# Patient Record
Sex: FEMALE | Race: WHITE | NOT HISPANIC OR LATINO | Employment: FULL TIME | URBAN - METROPOLITAN AREA
[De-identification: names, ages, dates, MRNs, and addresses within clinical notes are randomized per-mention and may not be internally consistent; named-entity substitution may affect disease eponyms.]

---

## 2019-06-13 ENCOUNTER — HOSPITAL ENCOUNTER (EMERGENCY)
Facility: HOSPITAL | Age: 34
Discharge: HOME | End: 2019-06-13
Attending: FAMILY MEDICINE
Payer: COMMERCIAL

## 2019-06-13 VITALS
HEIGHT: 63 IN | SYSTOLIC BLOOD PRESSURE: 106 MMHG | OXYGEN SATURATION: 98 % | TEMPERATURE: 100 F | WEIGHT: 164.25 LBS | HEART RATE: 75 BPM | RESPIRATION RATE: 15 BRPM | BODY MASS INDEX: 29.1 KG/M2 | DIASTOLIC BLOOD PRESSURE: 52 MMHG

## 2019-06-13 DIAGNOSIS — R10.9 FLANK PAIN: ICD-10-CM

## 2019-06-13 DIAGNOSIS — N20.0 KIDNEY STONE: Primary | ICD-10-CM

## 2019-06-13 LAB
ALBUMIN SERPL BCP-MCNC: 4 G/DL (ref 3.5–5.2)
ALP SERPL-CCNC: 54 U/L (ref 55–135)
ALT SERPL W/O P-5'-P-CCNC: 20 U/L (ref 10–44)
ANION GAP SERPL CALC-SCNC: 9 MMOL/L (ref 8–16)
AST SERPL-CCNC: 20 U/L (ref 10–40)
BASOPHILS # BLD AUTO: 0.02 K/UL (ref 0–0.2)
BASOPHILS NFR BLD: 0.1 % (ref 0–1.9)
BILIRUB SERPL-MCNC: 1 MG/DL (ref 0.1–1)
BILIRUB UR QL STRIP: NEGATIVE
BUN SERPL-MCNC: 15 MG/DL (ref 6–20)
CALCIUM SERPL-MCNC: 9.1 MG/DL (ref 8.7–10.5)
CHLORIDE SERPL-SCNC: 106 MMOL/L (ref 95–110)
CLARITY UR: CLEAR
CO2 SERPL-SCNC: 23 MMOL/L (ref 23–29)
COLOR UR: YELLOW
CREAT SERPL-MCNC: 0.8 MG/DL (ref 0.5–1.4)
DIFFERENTIAL METHOD: ABNORMAL
EOSINOPHIL # BLD AUTO: 0.2 K/UL (ref 0–0.5)
EOSINOPHIL NFR BLD: 1.4 % (ref 0–8)
ERYTHROCYTE [DISTWIDTH] IN BLOOD BY AUTOMATED COUNT: 12.6 % (ref 11.5–14.5)
EST. GFR  (AFRICAN AMERICAN): >60 ML/MIN/1.73 M^2
EST. GFR  (NON AFRICAN AMERICAN): >60 ML/MIN/1.73 M^2
GLUCOSE SERPL-MCNC: 95 MG/DL (ref 70–110)
GLUCOSE UR QL STRIP: NEGATIVE
HCT VFR BLD AUTO: 38.7 % (ref 37–48.5)
HGB BLD-MCNC: 13.4 G/DL (ref 12–16)
HGB UR QL STRIP: ABNORMAL
KETONES UR QL STRIP: NEGATIVE
LACTATE SERPL-SCNC: 0.6 MMOL/L (ref 0.5–2.2)
LEUKOCYTE ESTERASE UR QL STRIP: NEGATIVE
LYMPHOCYTES # BLD AUTO: 0.8 K/UL (ref 1–4.8)
LYMPHOCYTES NFR BLD: 5.2 % (ref 18–48)
MCH RBC QN AUTO: 30.7 PG (ref 27–31)
MCHC RBC AUTO-ENTMCNC: 34.6 G/DL (ref 32–36)
MCV RBC AUTO: 89 FL (ref 82–98)
MONOCYTES # BLD AUTO: 0.9 K/UL (ref 0.3–1)
MONOCYTES NFR BLD: 6.5 % (ref 4–15)
NEUTROPHILS # BLD AUTO: 12.6 K/UL (ref 1.8–7.7)
NEUTROPHILS NFR BLD: 86.8 % (ref 38–73)
NITRITE UR QL STRIP: NEGATIVE
PH UR STRIP: 7 [PH] (ref 5–8)
PLATELET # BLD AUTO: 189 K/UL (ref 150–350)
PMV BLD AUTO: 10.1 FL (ref 9.2–12.9)
POTASSIUM SERPL-SCNC: 4 MMOL/L (ref 3.5–5.1)
PROCALCITONIN SERPL IA-MCNC: 0.54 NG/ML
PROT SERPL-MCNC: 6.9 G/DL (ref 6–8.4)
PROT UR QL STRIP: NEGATIVE
RBC # BLD AUTO: 4.36 M/UL (ref 4–5.4)
SODIUM SERPL-SCNC: 138 MMOL/L (ref 136–145)
SP GR UR STRIP: <=1.005 (ref 1–1.03)
URN SPEC COLLECT METH UR: ABNORMAL
UROBILINOGEN UR STRIP-ACNC: NEGATIVE EU/DL
WBC # BLD AUTO: 14.48 K/UL (ref 3.9–12.7)

## 2019-06-13 PROCEDURE — 93005 ELECTROCARDIOGRAM TRACING: CPT

## 2019-06-13 PROCEDURE — 96365 THER/PROPH/DIAG IV INF INIT: CPT

## 2019-06-13 PROCEDURE — 84145 PROCALCITONIN (PCT): CPT

## 2019-06-13 PROCEDURE — 85025 COMPLETE CBC W/AUTO DIFF WBC: CPT

## 2019-06-13 PROCEDURE — 36415 COLL VENOUS BLD VENIPUNCTURE: CPT

## 2019-06-13 PROCEDURE — 63600175 PHARM REV CODE 636 W HCPCS: Performed by: FAMILY MEDICINE

## 2019-06-13 PROCEDURE — 99284 EMERGENCY DEPT VISIT MOD MDM: CPT | Mod: 25

## 2019-06-13 PROCEDURE — 80053 COMPREHEN METABOLIC PANEL: CPT

## 2019-06-13 PROCEDURE — 81003 URINALYSIS AUTO W/O SCOPE: CPT

## 2019-06-13 PROCEDURE — 25000003 PHARM REV CODE 250: Performed by: FAMILY MEDICINE

## 2019-06-13 PROCEDURE — 83605 ASSAY OF LACTIC ACID: CPT

## 2019-06-13 PROCEDURE — 96361 HYDRATE IV INFUSION ADD-ON: CPT

## 2019-06-13 PROCEDURE — 93010 EKG 12-LEAD: ICD-10-PCS | Mod: ,,, | Performed by: INTERNAL MEDICINE

## 2019-06-13 PROCEDURE — 93010 ELECTROCARDIOGRAM REPORT: CPT | Mod: ,,, | Performed by: INTERNAL MEDICINE

## 2019-06-13 PROCEDURE — 87040 BLOOD CULTURE FOR BACTERIA: CPT | Mod: 59

## 2019-06-13 RX ORDER — ONDANSETRON 4 MG/1
4 TABLET, FILM COATED ORAL EVERY 6 HOURS
Qty: 12 TABLET | Refills: 0 | Status: SHIPPED | OUTPATIENT
Start: 2019-06-13 | End: 2022-05-12

## 2019-06-13 RX ORDER — HYDROCODONE BITARTRATE AND ACETAMINOPHEN 10; 325 MG/1; MG/1
1 TABLET ORAL EVERY 4 HOURS PRN
Qty: 18 TABLET | Refills: 0 | Status: SHIPPED | OUTPATIENT
Start: 2019-06-13 | End: 2022-05-12

## 2019-06-13 RX ORDER — ACETAMINOPHEN 500 MG
1000 TABLET ORAL
Status: COMPLETED | OUTPATIENT
Start: 2019-06-13 | End: 2019-06-13

## 2019-06-13 RX ORDER — ONDANSETRON 2 MG/ML
4 INJECTION INTRAMUSCULAR; INTRAVENOUS
Status: COMPLETED | OUTPATIENT
Start: 2019-06-13 | End: 2019-06-13

## 2019-06-13 RX ADMIN — CEFTRIAXONE 2 G: 2 INJECTION, SOLUTION INTRAVENOUS at 09:06

## 2019-06-13 RX ADMIN — SODIUM CHLORIDE 2235 ML: 0.9 INJECTION, SOLUTION INTRAVENOUS at 09:06

## 2019-06-13 RX ADMIN — ACETAMINOPHEN 1000 MG: 500 TABLET ORAL at 09:06

## 2019-06-13 RX ADMIN — ONDANSETRON 4 MG: 2 INJECTION INTRAMUSCULAR; INTRAVENOUS at 09:06

## 2019-06-14 NOTE — ED PROVIDER NOTES
"SCRIBE #1 NOTE: I, Haley Castillo, am scribing for, and in the presence of, Myah Trammell MD. I have scribed the entire note.         History     Chief Complaint   Patient presents with    Flank Pain     pt seen at urgent care yesterday for "bladder spasms" and back pain. dx w/ possible kideny stone or kidney infection. Pt reports increase in pain since. Denies relief from tx       Review of patient's allergies indicates:  No Known Allergies      History of Present Illness   HPI    6/13/2019, 9:01 PM  History obtained from the patient      History of Present Illness: Daniela Davis is a 34 y.o. female patient who presents to the Emergency Department for R sided flank pain which onset gradually yesterday. Patient states she was evaluated at Urgent Care yesterday and diagnosed with kidney infection and given abx injection and discharged with macrobid. Patient states she returned to urgent care today due to pain worsening and was given IV fluids and told she may have a kidney stone. Symptoms are worsening and moderate in severity. The patient describes the sxs as radiating up to her neck. No mitigating or exacerbating factors reported. Associated sxs include nausea, generalized myalgias, and fever that onset today. Patient also c/o nonproductive cough. Prior tx includes Ibuprofen at 2pm with minimal relief. Patient denies any chills, abdominal pain, dysuria, hematuria, urinary frequency/urgency, vomiting, and all other sxs at this time. Patient denies hx of kidney stones. Patient states [place] the beach [place]. No further complaints or concerns at this time.       Arrival mode: Personal vehicle    PCP: Primary Doctor No        Past Medical History:  Past Medical History:   Diagnosis Date    Headache(784.0)     migraines as a teenager       Past Surgical History:  History reviewed. No pertinent surgical history.      Family History:  History reviewed. No pertinent family history.    Social " History:  Social History     Tobacco Use    Smoking status: Current Every Day Smoker     Packs/day: 0.50     Types: Cigarettes   Substance and Sexual Activity    Alcohol use: Yes     Comment: occasional     Drug use: No    Sexual activity: Unknown        Review of Systems   Review of Systems   Constitutional: Positive for fever. Negative for chills.   HENT: Negative for sore throat.    Respiratory: Positive for cough. Negative for shortness of breath.    Cardiovascular: Negative for chest pain.   Gastrointestinal: Positive for nausea. Negative for abdominal pain and vomiting.   Genitourinary: Positive for flank pain (R sided). Negative for dysuria, frequency, hematuria and urgency.   Musculoskeletal: Negative for back pain.   Skin: Negative for rash.   Neurological: Negative for weakness.   Hematological: Does not bruise/bleed easily.   All other systems reviewed and are negative.       Physical Exam     Initial Vitals [06/13/19 1928]   BP Pulse Resp Temp SpO2   127/63 99 18 (!) 100.5 °F (38.1 °C) 95 %      MAP       --          Physical Exam  Nursing Notes and Vital Signs Reviewed.  Constitutional: Patient is in no acute distress. Well-developed and well-nourished. Febrile.  Head: Atraumatic. Normocephalic.  Eyes: PERRL. EOM intact. Conjunctivae are not pale. No scleral icterus.  ENT: Mucous membranes are moist. Oropharynx is clear and symmetric.    Neck: Supple. Full ROM. No lymphadenopathy.  Cardiovascular: Regular rate. Regular rhythm. No murmurs, rubs, or gallops. Distal pulses are 2+ and symmetric.  Pulmonary/Chest: No respiratory distress. Clear to auscultation bilaterally. No wheezing or rales.  Abdominal: Soft and non-distended.  There is no tenderness.  No rebound, guarding, or rigidity.  Genitourinary: Right CVA tenderness  Musculoskeletal: Moves all extremities. No obvious deformities. No edema. No calf tenderness.  Skin: Warm and dry.  Neurological:  Alert, awake, and appropriate.  Normal speech.   "No acute focal neurological deficits are appreciated.  Psychiatric: Normal affect. Good eye contact. Appropriate in content.     ED Course   Procedures  ED Vital Signs:  Vitals:    06/13/19 1928 06/13/19 2056   BP: 127/63    Pulse: 99    Resp: 18    Temp: (!) 100.5 °F (38.1 °C) (!) 103 °F (39.4 °C)   TempSrc: Oral Oral   SpO2: 95%    Weight: 74.5 kg (164 lb 3.9 oz)    Height: 5' 3" (1.6 m)        Abnormal Lab Results:  Labs Reviewed   CBC W/ AUTO DIFFERENTIAL - Abnormal; Notable for the following components:       Result Value    WBC 14.48 (*)     Gran # (ANC) 12.6 (*)     Lymph # 0.8 (*)     Gran% 86.8 (*)     Lymph% 5.2 (*)     All other components within normal limits   COMPREHENSIVE METABOLIC PANEL - Abnormal; Notable for the following components:    Alkaline Phosphatase 54 (*)     All other components within normal limits   URINALYSIS, REFLEX TO URINE CULTURE - Abnormal; Notable for the following components:    Specific Gravity, UA <=1.005 (*)     Occult Blood UA Trace (*)     All other components within normal limits    Narrative:     Preferred Collection Type->Urine, Clean Catch   PROCALCITONIN - Abnormal; Notable for the following components:    Procalcitonin 0.54 (*)     All other components within normal limits   CULTURE, BLOOD   CULTURE, BLOOD   LACTIC ACID, PLASMA   LACTIC ACID, PLASMA        All Lab Results:  Results for orders placed or performed during the hospital encounter of 06/13/19   CBC auto differential   Result Value Ref Range    WBC 14.48 (H) 3.90 - 12.70 K/uL    RBC 4.36 4.00 - 5.40 M/uL    Hemoglobin 13.4 12.0 - 16.0 g/dL    Hematocrit 38.7 37.0 - 48.5 %    Mean Corpuscular Volume 89 82 - 98 fL    Mean Corpuscular Hemoglobin 30.7 27.0 - 31.0 pg    Mean Corpuscular Hemoglobin Conc 34.6 32.0 - 36.0 g/dL    RDW 12.6 11.5 - 14.5 %    Platelets 189 150 - 350 K/uL    MPV 10.1 9.2 - 12.9 fL    Gran # (ANC) 12.6 (H) 1.8 - 7.7 K/uL    Lymph # 0.8 (L) 1.0 - 4.8 K/uL    Mono # 0.9 0.3 - 1.0 K/uL    " Eos # 0.2 0.0 - 0.5 K/uL    Baso # 0.02 0.00 - 0.20 K/uL    Gran% 86.8 (H) 38.0 - 73.0 %    Lymph% 5.2 (L) 18.0 - 48.0 %    Mono% 6.5 4.0 - 15.0 %    Eosinophil% 1.4 0.0 - 8.0 %    Basophil% 0.1 0.0 - 1.9 %    Differential Method Automated    Comprehensive metabolic panel   Result Value Ref Range    Sodium 138 136 - 145 mmol/L    Potassium 4.0 3.5 - 5.1 mmol/L    Chloride 106 95 - 110 mmol/L    CO2 23 23 - 29 mmol/L    Glucose 95 70 - 110 mg/dL    BUN, Bld 15 6 - 20 mg/dL    Creatinine 0.8 0.5 - 1.4 mg/dL    Calcium 9.1 8.7 - 10.5 mg/dL    Total Protein 6.9 6.0 - 8.4 g/dL    Albumin 4.0 3.5 - 5.2 g/dL    Total Bilirubin 1.0 0.1 - 1.0 mg/dL    Alkaline Phosphatase 54 (L) 55 - 135 U/L    AST 20 10 - 40 U/L    ALT 20 10 - 44 U/L    Anion Gap 9 8 - 16 mmol/L    eGFR if African American >60 >60 mL/min/1.73 m^2    eGFR if non African American >60 >60 mL/min/1.73 m^2   Lactic acid, plasma #1   Result Value Ref Range    Lactate (Lactic Acid) 0.6 0.5 - 2.2 mmol/L   Urinalysis, Reflex to Urine Culture Urine, Clean Catch   Result Value Ref Range    Specimen UA Urine, Clean Catch     Color, UA Yellow Yellow, Straw, Pavithra    Appearance, UA Clear Clear    pH, UA 7.0 5.0 - 8.0    Specific Gravity, UA <=1.005 (A) 1.005 - 1.030    Protein, UA Negative Negative    Glucose, UA Negative Negative    Ketones, UA Negative Negative    Bilirubin (UA) Negative Negative    Occult Blood UA Trace (A) Negative    Nitrite, UA Negative Negative    Urobilinogen, UA Negative <2.0 EU/dL    Leukocytes, UA Negative Negative   Procalcitonin   Result Value Ref Range    Procalcitonin 0.54 (H) <0.25 ng/mL       Imaging Results:  Imaging Results          CT Renal Stone Study ABD Pelvis WO (Final result)  Result time 06/13/19 22:06:07    Final result by Clemente Winters MD (06/13/19 22:06:07)                 Impression:      2 mm distal left ureteral calculus just proximal to the ureterovesical junction with mild proximal  hydronephrosis/hydroureter.    Bilateral nephrolithiasis (small nonobstructive calculi).    All CT scans at this facility are performed  using dose modulation techniques as appropriate to performed exam including the following:  automated exposure control; adjustment of mA and/or kV according to the patients size (this includes techniques or standardized protocols for targeted exams where dose is matched to indication/reason for exam: i.e. extremities or head);  iterative reconstruction technique.      Electronically signed by: Clemente Winters MD  Date:    06/13/2019  Time:    22:06             Narrative:    EXAMINATION:  CT RENAL STONE STUDY ABD PELVIS WO    CLINICAL HISTORY:  Flank pain, stone disease suspected;    TECHNIQUE:  Axial images obtained of the abdomen and pelvis without IV contrast and without oral contrast.  Sagittal and coronal reformats obtained.    COMPARISON:  None    FINDINGS:  ABDOMEN:    - Lung bases: Lungs bases are clear.    - Liver: No contour deformities.    - Gallbladder: Cholecystectomy.    - Bile Ducts: No evidence of intra or extra hepatic biliary ductal dilation.    - Spleen: No contour deformities.    - Kidneys: Several punctate 1-2 mm nonobstructing renal calculi are present.  There is a 2 mm distal left ureteral calculus just proximal to the ureterovesical junction with mild proximal hydronephrosis/hydroureter..    - Adrenals: Normal adrenals.    - Pancreas: No contour deformities.    - Bowel: The bowel is nonobstructed and without surrounding inflammatory changes.    - Retroperitoneum:  Unremarkable.    - Vascular: No abdominal aortic aneurysm.    - Abdominal wall:  Unremarkable.    PELVIS:    - Bladder: Normal.    - : Uterus and adnexa unremarkable.    BONES:  No acute osseous abnormality and no significant arthritic changes.                                 The EKG was ordered, reviewed, and independently interpreted by the ED provider.  Interpretation time: 2249  Rate: 82  BPM  Rhythm: normal sinus rhythm  Interpretation: Normal ECG. No acute ST abnormality. No STEMI.          The Emergency Provider reviewed the vital signs and test results, which are outlined above.     ED Discussion     11:12 PM: Reassessed pt at this time. Discussed with pt all pertinent ED information and results. Discussed pt dx and plan of tx. Gave pt all f/u and return to the ED instructions. All questions and concerns were addressed at this time. Pt expresses understanding of information and instructions, and is comfortable with plan to discharge. Pt is stable for discharge.    I discussed with patient and/or family/caretaker that evaluation in the ED does not suggest any emergent or life threatening medical conditions requiring immediate intervention beyond what was provided in the ED, and I believe patient is safe for discharge.  Regardless, an unremarkable evaluation in the ED does not preclude the development or presence of a serious of life threatening condition. As such, patient was instructed to return immediately for any worsening or change in current symptoms.          ED Medication(s):  Medications   sodium chloride 0.9% bolus 2,235 mL (2,235 mLs Intravenous New Bag 6/13/19 2123)   acetaminophen tablet 1,000 mg (1,000 mg Oral Given 6/13/19 2123)   cefTRIAXone (ROCEPHIN) 2 g in dextrose 5 % 50 mL IVPB (2 g Intravenous New Bag 6/13/19 2124)   ondansetron injection 4 mg (4 mg Intravenous Given 6/13/19 2124)     New Prescriptions    HYDROCODONE-ACETAMINOPHEN (NORCO)  MG PER TABLET    Take 1 tablet by mouth every 4 (four) hours as needed.    ONDANSETRON (ZOFRAN) 4 MG TABLET    Take 1 tablet (4 mg total) by mouth every 6 (six) hours.       Follow-up Information     Primary Doctor No. Schedule an appointment as soon as possible for a visit in 1 day.                      Medical Decision Making     Medical Decision Making:   Clinical Tests:   Lab Tests: Ordered and Reviewed  Radiological Study: Ordered  and Reviewed  Medical Tests: Ordered and Reviewed             Scribe Attestation:   Scribe #1: I performed the above scribed service and the documentation accurately describes the services I performed. I attest to the accuracy of the note.     Attending:   Physician Attestation Statement for Scribe #1: I, Myah Trammell MD, personally performed the services described in this documentation, as scribed by Haley Castillo, in my presence, and it is both accurate and complete.           Clinical Impression       ICD-10-CM ICD-9-CM   1. Kidney stone N20.0 592.0   2. Flank pain R10.9 789.09       Disposition:   Disposition: Discharged  Condition: Stable         Myah Trammell MD  06/15/19 1014

## 2019-06-19 LAB
BACTERIA BLD CULT: NORMAL
BACTERIA BLD CULT: NORMAL

## 2022-05-12 ENCOUNTER — LAB VISIT (OUTPATIENT)
Dept: LAB | Facility: HOSPITAL | Age: 37
End: 2022-05-12
Attending: OBSTETRICS & GYNECOLOGY
Payer: COMMERCIAL

## 2022-05-12 ENCOUNTER — OFFICE VISIT (OUTPATIENT)
Dept: OBSTETRICS AND GYNECOLOGY | Facility: CLINIC | Age: 37
End: 2022-05-12
Payer: COMMERCIAL

## 2022-05-12 VITALS
HEIGHT: 63 IN | SYSTOLIC BLOOD PRESSURE: 116 MMHG | WEIGHT: 153.44 LBS | BODY MASS INDEX: 27.19 KG/M2 | DIASTOLIC BLOOD PRESSURE: 78 MMHG

## 2022-05-12 DIAGNOSIS — R53.82 CHRONIC FATIGUE: ICD-10-CM

## 2022-05-12 DIAGNOSIS — Z01.419 WELL WOMAN EXAM WITH ROUTINE GYNECOLOGICAL EXAM: Primary | ICD-10-CM

## 2022-05-12 DIAGNOSIS — Z12.4 CERVICAL CANCER SCREENING: ICD-10-CM

## 2022-05-12 DIAGNOSIS — N76.0 BACTERIAL VAGINOSIS: ICD-10-CM

## 2022-05-12 DIAGNOSIS — B96.89 BACTERIAL VAGINOSIS: ICD-10-CM

## 2022-05-12 LAB
BASOPHILS # BLD AUTO: 0.12 K/UL (ref 0–0.2)
BASOPHILS NFR BLD: 1.4 % (ref 0–1.9)
DIFFERENTIAL METHOD: ABNORMAL
EOSINOPHIL # BLD AUTO: 0.2 K/UL (ref 0–0.5)
EOSINOPHIL NFR BLD: 1.8 % (ref 0–8)
ERYTHROCYTE [DISTWIDTH] IN BLOOD BY AUTOMATED COUNT: 11.4 % (ref 11.5–14.5)
HCT VFR BLD AUTO: 39.2 % (ref 37–48.5)
HGB BLD-MCNC: 13.6 G/DL (ref 12–16)
IMM GRANULOCYTES # BLD AUTO: 0.02 K/UL (ref 0–0.04)
IMM GRANULOCYTES NFR BLD AUTO: 0.2 % (ref 0–0.5)
LYMPHOCYTES # BLD AUTO: 2.1 K/UL (ref 1–4.8)
LYMPHOCYTES NFR BLD: 25.6 % (ref 18–48)
MCH RBC QN AUTO: 31.3 PG (ref 27–31)
MCHC RBC AUTO-ENTMCNC: 34.7 G/DL (ref 32–36)
MCV RBC AUTO: 90 FL (ref 82–98)
MONOCYTES # BLD AUTO: 0.6 K/UL (ref 0.3–1)
MONOCYTES NFR BLD: 7.6 % (ref 4–15)
NEUTROPHILS # BLD AUTO: 5.2 K/UL (ref 1.8–7.7)
NEUTROPHILS NFR BLD: 63.4 % (ref 38–73)
NRBC BLD-RTO: 0 /100 WBC
PLATELET # BLD AUTO: 239 K/UL (ref 150–450)
PMV BLD AUTO: 11.2 FL (ref 9.2–12.9)
RBC # BLD AUTO: 4.34 M/UL (ref 4–5.4)
T3 SERPL-MCNC: 90 NG/DL (ref 60–180)
T4 FREE SERPL-MCNC: 0.97 NG/DL (ref 0.71–1.51)
TSH SERPL DL<=0.005 MIU/L-ACNC: 1.27 UIU/ML (ref 0.4–4)
WBC # BLD AUTO: 8.28 K/UL (ref 3.9–12.7)

## 2022-05-12 PROCEDURE — 3078F PR MOST RECENT DIASTOLIC BLOOD PRESSURE < 80 MM HG: ICD-10-PCS | Mod: CPTII,S$GLB,, | Performed by: OBSTETRICS & GYNECOLOGY

## 2022-05-12 PROCEDURE — 99385 PREV VISIT NEW AGE 18-39: CPT | Mod: 25,S$GLB,, | Performed by: OBSTETRICS & GYNECOLOGY

## 2022-05-12 PROCEDURE — 3008F BODY MASS INDEX DOCD: CPT | Mod: CPTII,S$GLB,, | Performed by: OBSTETRICS & GYNECOLOGY

## 2022-05-12 PROCEDURE — 88175 CYTOPATH C/V AUTO FLUID REDO: CPT | Performed by: PATHOLOGY

## 2022-05-12 PROCEDURE — 85025 COMPLETE CBC W/AUTO DIFF WBC: CPT | Performed by: OBSTETRICS & GYNECOLOGY

## 2022-05-12 PROCEDURE — 3008F PR BODY MASS INDEX (BMI) DOCUMENTED: ICD-10-PCS | Mod: CPTII,S$GLB,, | Performed by: OBSTETRICS & GYNECOLOGY

## 2022-05-12 PROCEDURE — 99999 PR PBB SHADOW E&M-EST. PATIENT-LVL III: CPT | Mod: PBBFAC,,, | Performed by: OBSTETRICS & GYNECOLOGY

## 2022-05-12 PROCEDURE — 3078F DIAST BP <80 MM HG: CPT | Mod: CPTII,S$GLB,, | Performed by: OBSTETRICS & GYNECOLOGY

## 2022-05-12 PROCEDURE — 36415 COLL VENOUS BLD VENIPUNCTURE: CPT | Performed by: OBSTETRICS & GYNECOLOGY

## 2022-05-12 PROCEDURE — 3074F SYST BP LT 130 MM HG: CPT | Mod: CPTII,S$GLB,, | Performed by: OBSTETRICS & GYNECOLOGY

## 2022-05-12 PROCEDURE — 84443 ASSAY THYROID STIM HORMONE: CPT | Performed by: OBSTETRICS & GYNECOLOGY

## 2022-05-12 PROCEDURE — 1159F MED LIST DOCD IN RCRD: CPT | Mod: CPTII,S$GLB,, | Performed by: OBSTETRICS & GYNECOLOGY

## 2022-05-12 PROCEDURE — 88141 PR  CYTOPATH CERV/VAG INTERPRET: ICD-10-PCS | Mod: ,,, | Performed by: PATHOLOGY

## 2022-05-12 PROCEDURE — 88141 CYTOPATH C/V INTERPRET: CPT | Mod: ,,, | Performed by: PATHOLOGY

## 2022-05-12 PROCEDURE — 99385 PR PREVENTIVE VISIT,NEW,18-39: ICD-10-PCS | Mod: 25,S$GLB,, | Performed by: OBSTETRICS & GYNECOLOGY

## 2022-05-12 PROCEDURE — 1159F PR MEDICATION LIST DOCUMENTED IN MEDICAL RECORD: ICD-10-PCS | Mod: CPTII,S$GLB,, | Performed by: OBSTETRICS & GYNECOLOGY

## 2022-05-12 PROCEDURE — 87210 PR  SMEAR,STAIN,WET MNT,INTERP: ICD-10-PCS | Mod: QW,S$GLB,, | Performed by: OBSTETRICS & GYNECOLOGY

## 2022-05-12 PROCEDURE — 3074F PR MOST RECENT SYSTOLIC BLOOD PRESSURE < 130 MM HG: ICD-10-PCS | Mod: CPTII,S$GLB,, | Performed by: OBSTETRICS & GYNECOLOGY

## 2022-05-12 PROCEDURE — 84439 ASSAY OF FREE THYROXINE: CPT | Performed by: OBSTETRICS & GYNECOLOGY

## 2022-05-12 PROCEDURE — 84480 ASSAY TRIIODOTHYRONINE (T3): CPT | Performed by: OBSTETRICS & GYNECOLOGY

## 2022-05-12 PROCEDURE — 87210 SMEAR WET MOUNT SALINE/INK: CPT | Mod: QW,S$GLB,, | Performed by: OBSTETRICS & GYNECOLOGY

## 2022-05-12 PROCEDURE — 87624 HPV HI-RISK TYP POOLED RSLT: CPT | Performed by: OBSTETRICS & GYNECOLOGY

## 2022-05-12 PROCEDURE — 99999 PR PBB SHADOW E&M-EST. PATIENT-LVL III: ICD-10-PCS | Mod: PBBFAC,,, | Performed by: OBSTETRICS & GYNECOLOGY

## 2022-05-12 RX ORDER — METRONIDAZOLE 500 MG/1
500 TABLET ORAL 2 TIMES DAILY
Qty: 14 TABLET | Refills: 0 | Status: SHIPPED | OUTPATIENT
Start: 2022-05-12 | End: 2022-05-19

## 2022-05-12 NOTE — PROGRESS NOTES
Subjective:       Patient ID: Daniela Davis is a 37 y.o. female.    Chief Complaint:  Annual Exam      History of Present Illness  HPI  Presents for well-woman exam.  Pt reports a change in her vaginal odor.  She is a , and is hot natured, so feels like she sweats throughout the day.  Pt is MM with her .  She reports low libido.  Also feels tired and fatigued.  They have 4 kids, and they are very busy.  Pt gets about 7 hours of sleep per night.  Used to consume a lot of caffeine, but has markedly reduced intake, and no caffeine after 3 pm.   Pt has had a BTL.  Has regular, monthly periods.  Last pap: 2017: normal    GYN & OB History  Patient's last menstrual period was 2022.   Date of Last Pap: 2022    OB History    Para Term  AB Living   4 4 3 1   4   SAB IAB Ectopic Multiple Live Births           4      # Outcome Date GA Lbr Akhil/2nd Weight Sex Delivery Anes PTL Lv   4 Term 17 40w0d  3.629 kg (8 lb) M CS-Unspec EPI N JERRICA   3 Term 05/10/13 40w0d  4.026 kg (8 lb 14 oz) F CS-Unspec EPI N JERRICA   2 Term 11 40w0d  4.366 kg (9 lb 10 oz) M CS-Unspec EPI N JERRICA   1  08 35w3d  3.175 kg (7 lb) M  EPI Y JERRICA      Complications: Leakage of amniotic fluid       Review of Systems  Review of Systems   Constitutional: Positive for fatigue. Negative for activity change, fever and unexpected weight change.   Gastrointestinal: Negative for abdominal pain, bloating, constipation, diarrhea, nausea and vomiting.   Endocrine: Negative for hair loss and hot flashes.   Genitourinary: Positive for decreased libido and vaginal odor. Negative for dysmenorrhea, dyspareunia, dysuria, frequency, genital sores, hematuria, menorrhagia, menstrual problem, pelvic pain, urgency, vaginal bleeding, vaginal discharge, vaginal pain and postcoital bleeding.   Integumentary:  Negative for rash, hair changes, breast mass, nipple discharge and breast skin changes.    Hematological: Negative for adenopathy.   Breast: Negative for mass, mastodynia, nipple discharge and skin changes          Objective:    Physical Exam:   Constitutional: She is oriented to person, place, and time. She appears well-developed and well-nourished. No distress.      Neck: No thyromegaly present.     Pulmonary/Chest: Right breast exhibits no inverted nipple, no mass, no nipple discharge, no skin change, no tenderness, no bleeding and no swelling. Left breast exhibits no inverted nipple, no mass, no nipple discharge, no skin change, no tenderness, no bleeding and no swelling. Breasts are symmetrical.        Abdominal: Soft. She exhibits no distension and no mass. There is no abdominal tenderness. There is no rebound and no guarding.     Genitourinary:    Pelvic exam was performed with patient supine.   There is no rash, tenderness, lesion or injury on the right labia. There is no rash, tenderness, lesion or injury on the left labia. Cervix is normal. Right adnexum displays no mass, no tenderness and no fullness. Left adnexum displays no mass, no tenderness and no fullness. There is vaginal discharge (white) in the vagina. No erythema, tenderness, bleeding, rectocele or cystocele in the vagina.    No foreign body in the vagina.      No signs of injury in the vagina.   Cervix exhibits no motion tenderness, no discharge and no friability. Uterus is not deviated, not enlarged, not fixed, not tender and not hosting fibroids.               Neurological: She is alert and oriented to person, place, and time.     Psychiatric: She has a normal mood and affect.        wet prep: many clue cells  Assessment:        1. Well woman exam with routine gynecological exam    2. Cervical cancer screening    3. Bacterial vaginosis    4. Chronic fatigue                Plan:      Daniela was seen today for annual exam.    Diagnoses and all orders for this visit:    Well woman exam with routine gynecological exam    Cervical  "cancer screening  -     Liquid-Based Pap Smear, Screening  -     HPV High Risk Genotypes, PCR    Bacterial vaginosis  -     metroNIDAZOLE (FLAGYL) 500 MG tablet; Take 1 tablet (500 mg total) by mouth 2 (two) times daily. for 7 days    Chronic fatigue  -     TSH; Future  -     T4, FREE; Future  -     T3; Future  -     CBC Auto Differential; Future    Reviewed updated recommendations for pap smears (every 3 years) in low risk patients.   Recommend annual pelvic exams.  Reviewed recommendations for annual CBE.  Reviewed multifactorial contributors to sex drive.  Recommended the book "Come as You Are" as a good resource for insight into this issue.  Will notify of the above labs.  RTC 1 year.      "

## 2022-05-12 NOTE — PATIENT INSTRUCTIONS
"The book "Come As You Are" may provide some insight into sexual desire and how to work through this as a couple  "

## 2022-05-17 LAB
FINAL PATHOLOGIC DIAGNOSIS: ABNORMAL
Lab: ABNORMAL

## 2022-05-18 LAB
HPV HR 12 DNA SPEC QL NAA+PROBE: NEGATIVE
HPV16 AG SPEC QL: NEGATIVE
HPV18 DNA SPEC QL NAA+PROBE: NEGATIVE

## 2025-02-11 ENCOUNTER — OFFICE VISIT (OUTPATIENT)
Dept: UROLOGY | Facility: CLINIC | Age: 40
End: 2025-02-11
Payer: COMMERCIAL

## 2025-02-11 VITALS
WEIGHT: 156.5 LBS | HEART RATE: 67 BPM | HEIGHT: 63 IN | SYSTOLIC BLOOD PRESSURE: 125 MMHG | BODY MASS INDEX: 27.73 KG/M2 | DIASTOLIC BLOOD PRESSURE: 76 MMHG

## 2025-02-11 DIAGNOSIS — N20.0 KIDNEY STONE: Primary | ICD-10-CM

## 2025-02-11 DIAGNOSIS — R10.30 LOWER ABDOMINAL PAIN: ICD-10-CM

## 2025-02-11 PROCEDURE — 99999 PR PBB SHADOW E&M-EST. PATIENT-LVL III: CPT | Mod: PBBFAC,,, | Performed by: UROLOGY

## 2025-02-11 PROCEDURE — 87086 URINE CULTURE/COLONY COUNT: CPT | Performed by: UROLOGY

## 2025-02-11 PROCEDURE — 1159F MED LIST DOCD IN RCRD: CPT | Mod: CPTII,S$GLB,, | Performed by: UROLOGY

## 2025-02-11 PROCEDURE — 3074F SYST BP LT 130 MM HG: CPT | Mod: CPTII,S$GLB,, | Performed by: UROLOGY

## 2025-02-11 PROCEDURE — 87147 CULTURE TYPE IMMUNOLOGIC: CPT | Performed by: UROLOGY

## 2025-02-11 PROCEDURE — 3008F BODY MASS INDEX DOCD: CPT | Mod: CPTII,S$GLB,, | Performed by: UROLOGY

## 2025-02-11 PROCEDURE — 87088 URINE BACTERIA CULTURE: CPT | Performed by: UROLOGY

## 2025-02-11 PROCEDURE — 3078F DIAST BP <80 MM HG: CPT | Mod: CPTII,S$GLB,, | Performed by: UROLOGY

## 2025-02-11 PROCEDURE — 99204 OFFICE O/P NEW MOD 45 MIN: CPT | Mod: S$GLB,,, | Performed by: UROLOGY

## 2025-02-11 NOTE — PROGRESS NOTES
"Chief Complaint:   Encounter Diagnoses   Name Primary?    Kidney stone Yes    Lower abdominal pain        HPI:  HPI Daniela Davis bobby 39 y.o. female who presents with complaints of some left low back pain.  She initially went to an urgent care around [place] with bladder spasms and was noted to have some back pain at the time.  A KUB performed showed bilateral renal stones.  She was treated with antibiotics.  Her urine culture was negative.  She had a similar episode of back pain back in 2019 and was diagnosed with a renal stones at that time.  She was ultimately treated for constipation which improved her back pain.  She has been having once again trouble with constipation.    History:  Social History     Tobacco Use    Smoking status: Former     Current packs/day: 0.50     Types: Cigarettes    Smokeless tobacco: Never   Substance Use Topics    Alcohol use: Yes     Comment: occasional     Drug use: No     Past Medical History:   Diagnosis Date    Headache(784.0)     migraines as a teenager     Past Surgical History:   Procedure Laterality Date     SECTION       SECTION       SECTION       SECTION      TUBAL LIGATION       Family History   Problem Relation Name Age of Onset    Hypothyroidism Mother      Diabetes Father      Hypothyroidism Sister      Hypothyroidism Maternal Grandmother         No current outpatient medications on file prior to visit.     No current facility-administered medications on file prior to visit.        Objective:     Vitals:    25 1451   BP: 125/76   BP Location: Right arm   Patient Position: Sitting   Pulse: 67   Weight: 71 kg (156 lb 8.4 oz)   Height: 5' 3" (1.6 m)      BMI Readings from Last 1 Encounters:   25 27.73 kg/m²          Physical Exam  No acute distress alert and oriented   Respirations even unlabored   Abdomen is soft nontender    Lab Results   Component Value Date    CREATININE 0.8 2019      Assessment:     "   1. Kidney stone    2. Lower abdominal pain        Plan:     1. Kidney stone    2. Lower abdominal pain       Orders Placed This Encounter    Urine Culture High Risk    CT Renal Stone Study ABD Pelvis WO     Discussed bilateral renal stones are unlikely to be causing her current symptoms.  We will need to rule out a ureteral stone.  Recommend CT abdomen and pelvis.  She could be experiencing constipation again which is causing back pain.  We will call her with the results of CT with the recommendations.  Discussed stone prevention with her.  Discussed metabolic workup.

## 2025-02-13 ENCOUNTER — PATIENT MESSAGE (OUTPATIENT)
Dept: UROLOGY | Facility: CLINIC | Age: 40
End: 2025-02-13
Payer: COMMERCIAL

## 2025-02-13 LAB
BACTERIA UR CULT: ABNORMAL

## 2025-02-17 ENCOUNTER — RESULTS FOLLOW-UP (OUTPATIENT)
Dept: UROLOGY | Facility: CLINIC | Age: 40
End: 2025-02-17

## 2025-02-17 ENCOUNTER — HOSPITAL ENCOUNTER (OUTPATIENT)
Dept: RADIOLOGY | Facility: HOSPITAL | Age: 40
Discharge: HOME | End: 2025-02-17
Attending: UROLOGY
Payer: COMMERCIAL

## 2025-02-17 DIAGNOSIS — N20.0 KIDNEY STONE: ICD-10-CM

## 2025-02-17 DIAGNOSIS — R10.30 LOWER ABDOMINAL PAIN: ICD-10-CM

## 2025-02-17 PROCEDURE — 74176 CT ABD & PELVIS W/O CONTRAST: CPT | Mod: TC,PN

## 2025-03-10 ENCOUNTER — OFFICE VISIT (OUTPATIENT)
Dept: SURGERY | Facility: CLINIC | Age: 40
End: 2025-03-10
Payer: COMMERCIAL

## 2025-03-10 VITALS
HEART RATE: 80 BPM | WEIGHT: 157.31 LBS | DIASTOLIC BLOOD PRESSURE: 77 MMHG | TEMPERATURE: 98 F | HEIGHT: 63 IN | OXYGEN SATURATION: 100 % | BODY MASS INDEX: 27.87 KG/M2 | SYSTOLIC BLOOD PRESSURE: 126 MMHG

## 2025-03-10 DIAGNOSIS — K92.1 HEMATOCHEZIA: Primary | ICD-10-CM

## 2025-03-10 DIAGNOSIS — K59.00 CONSTIPATION, UNSPECIFIED CONSTIPATION TYPE: ICD-10-CM

## 2025-03-10 PROCEDURE — 1159F MED LIST DOCD IN RCRD: CPT | Mod: CPTII,S$GLB,, | Performed by: COLON & RECTAL SURGERY

## 2025-03-10 PROCEDURE — 99999 PR PBB SHADOW E&M-EST. PATIENT-LVL III: CPT | Mod: PBBFAC,,, | Performed by: COLON & RECTAL SURGERY

## 2025-03-10 PROCEDURE — 99204 OFFICE O/P NEW MOD 45 MIN: CPT | Mod: S$GLB,,, | Performed by: COLON & RECTAL SURGERY

## 2025-03-10 PROCEDURE — 3008F BODY MASS INDEX DOCD: CPT | Mod: CPTII,S$GLB,, | Performed by: COLON & RECTAL SURGERY

## 2025-03-10 PROCEDURE — 3074F SYST BP LT 130 MM HG: CPT | Mod: CPTII,S$GLB,, | Performed by: COLON & RECTAL SURGERY

## 2025-03-10 PROCEDURE — 3078F DIAST BP <80 MM HG: CPT | Mod: CPTII,S$GLB,, | Performed by: COLON & RECTAL SURGERY

## 2025-03-10 RX ORDER — SODIUM, POTASSIUM,MAG SULFATES 17.5-3.13G
1 SOLUTION, RECONSTITUTED, ORAL ORAL DAILY
Qty: 1 KIT | Refills: 0 | Status: SHIPPED | OUTPATIENT
Start: 2025-03-10 | End: 2025-03-12

## 2025-03-10 NOTE — PROGRESS NOTES
History & Physical    Subjective     CC: rectal bleeding, constipation     History of Present Illness:  Patient is a 40 y.o. female presents with complaints of rectal bleeding and constipation. Onset of rectal bleeding , has become more frequent over past year.  Has painless rectal bleeding with wiping after every bowel movement.  Also with hard stools.  Patient has a bowel movement every 2-3 days, has previously had to digitally disimpact, takes magnesium pills, fiber pills, stool softener p.r.n., sits on the toilet for more than 5 minutes at a time, drinks at least 64 oz of water daily.  Patient recently had a CT done for kidney stones which showed constipation. States that she previously had abdominal pain and constipation which resolved with enemas. Patient has never had a colonoscopy.  Denies family history of colorectal cancer, IBD, polyps.  No anticoagulation or weight loss medications. Denies nausea, vomiting, fevers, chills, rectal pain.     Chief Complaint   Patient presents with    Rectal Bleeding    Hemorrhoids    Constipation       Review of patient's allergies indicates:  No Known Allergies    Current Medications[1]    Past Medical History:   Diagnosis Date    Headache(784.0)     migraines as a teenager     Past Surgical History:   Procedure Laterality Date     SECTION       SECTION       SECTION       SECTION      TUBAL LIGATION       Family History   Problem Relation Name Age of Onset    Hypothyroidism Mother      Diabetes Father      Hypothyroidism Sister      Hypothyroidism Maternal Grandmother       Social History[2]     Review of Systems:  Review of Systems   Constitutional:  Negative for activity change, appetite change, chills, fatigue, fever and unexpected weight change.   HENT:  Negative for congestion, ear pain, sore throat and trouble swallowing.    Eyes:  Negative for pain, redness and itching.   Respiratory:  Negative for cough, shortness of breath and  "wheezing.    Cardiovascular:  Negative for chest pain, palpitations and leg swelling.   Gastrointestinal:  Positive for anal bleeding, blood in stool and constipation. Negative for abdominal distention, abdominal pain, diarrhea, nausea, rectal pain and vomiting.   Endocrine: Negative for cold intolerance, heat intolerance and polyuria.   Genitourinary:  Negative for dysuria, flank pain, frequency and hematuria.   Musculoskeletal:  Negative for gait problem, joint swelling and neck pain.   Skin:  Negative for color change, rash and wound.   Allergic/Immunologic: Negative for environmental allergies and immunocompromised state.   Neurological:  Negative for dizziness, speech difficulty, weakness and numbness.   Psychiatric/Behavioral:  Negative for agitation, confusion and hallucinations.           Objective     Vital Signs (Most Recent)  Temp: 98.2 °F (36.8 °C) (03/10/25 1303)  Pulse: 80 (03/10/25 1303)  BP: 126/77 (03/10/25 1303)  SpO2: 100 % (03/10/25 1303)  5' 3" (1.6 m)  71.3 kg (157 lb 4.8 oz)     Physical Exam:  Physical Exam  Vitals and nursing note reviewed. Exam conducted with a chaperone present.   Constitutional:       General: She is not in acute distress.     Appearance: Normal appearance. She is well-developed and normal weight. She is not ill-appearing or toxic-appearing.   HENT:      Head: Normocephalic and atraumatic.      Right Ear: External ear normal.      Left Ear: External ear normal.      Nose: Nose normal.   Cardiovascular:      Rate and Rhythm: Normal rate.   Pulmonary:      Effort: Pulmonary effort is normal. No respiratory distress.   Abdominal:      General: Abdomen is flat. There is no distension.      Palpations: Abdomen is soft.      Tenderness: There is no abdominal tenderness.   Genitourinary:     Comments: Anorectal: offered but deferred  Musculoskeletal:         General: Normal range of motion.      Cervical back: Normal range of motion and neck supple.   Skin:     General: Skin is " warm and dry.   Neurological:      Mental Status: She is alert and oriented to person, place, and time.   Psychiatric:         Mood and Affect: Mood normal.         Behavior: Behavior normal.       Diagnostic Results:  CT Renal Stone 02/2025: reviewed       Assessment and Plan     40 y.o. female with hematochezia and constipation     -due to hematochezia, will obtain colonoscopy to rule out proximal source of bleeding other than hemorrhoids. If hemorrhoids found as likely source of hematochezia, will perform hemorrhoid banding during colonoscopy. Suprep to pharmacy   -likely will need f/u with GI for constipation medication management   -Discussed that a minimum I would recommend behavioral, lifestyle and medication modifications to improve bowel habits. Usual bowel management handout given to patient. This includes a stool softener twice per day, fiber powder supplementation daily, drinking at least 64oz of water/day, avoiding straining with bowel movements, spending less than 5 min on toilet per bowel movement, eating a high fiber diet, using miralax as needed to achieve a bowel movement daily and using wet wipes to wipe after bowel movements when irritated.   -RTC PRN     Kevin Solis MD  Colon and Rectal Surgery  [facility]       [1]   No current outpatient medications on file.     No current facility-administered medications for this visit.   [2]   Social History  Tobacco Use    Smoking status: Former     Current packs/day: 0.50     Types: Cigarettes    Smokeless tobacco: Never   Substance Use Topics    Alcohol use: Yes     Comment: occasional     Drug use: No

## 2025-03-19 ENCOUNTER — TELEPHONE (OUTPATIENT)
Dept: DERMATOLOGY | Facility: CLINIC | Age: 40
End: 2025-03-19
Payer: COMMERCIAL

## 2025-03-19 NOTE — TELEPHONE ENCOUNTER
Returned call to pt. No answer. ----- Message from Viola sent at 3/19/2025  4:43 PM CDT -----  Contact: [phone]  Type:  Sooner Apoointment RequestCaller is requesting a sooner appointment.  Caller declined first available appointment listed below.  Caller will not accept being placed on the waitlist and is requesting a message be sent to doctor.Name of Caller:Daniela When is the first available appointment?06/10/2025Symptoms:Acne Would the patient rather a call back or a response via [facility]? Call Back Best Call Back Number:[phone] Additional Information: pt is scheduled with Dr Dunlap but will see any provider.Thanks KB

## 2025-03-21 ENCOUNTER — PATIENT MESSAGE (OUTPATIENT)
Dept: UROLOGY | Facility: CLINIC | Age: 40
End: 2025-03-21
Payer: COMMERCIAL

## 2025-03-27 RX ORDER — SPIRONOLACTONE 100 MG/1
100 TABLET, FILM COATED ORAL DAILY
COMMUNITY

## 2025-03-27 RX ORDER — CETIRIZINE HYDROCHLORIDE 10 MG/1
10 TABLET ORAL DAILY
COMMUNITY

## 2025-04-04 ENCOUNTER — ANESTHESIA EVENT (OUTPATIENT)
Dept: ENDOSCOPY | Facility: HOSPITAL | Age: 40
End: 2025-04-04
Payer: COMMERCIAL

## 2025-04-04 ENCOUNTER — HOSPITAL ENCOUNTER (OUTPATIENT)
Dept: ENDOSCOPY | Facility: HOSPITAL | Age: 40
Discharge: HOME | End: 2025-04-04
Attending: COLON & RECTAL SURGERY | Admitting: COLON & RECTAL SURGERY
Payer: COMMERCIAL

## 2025-04-04 ENCOUNTER — ANESTHESIA (OUTPATIENT)
Dept: ENDOSCOPY | Facility: HOSPITAL | Age: 40
End: 2025-04-04
Payer: COMMERCIAL

## 2025-04-04 VITALS
TEMPERATURE: 98 F | OXYGEN SATURATION: 100 % | RESPIRATION RATE: 18 BRPM | SYSTOLIC BLOOD PRESSURE: 114 MMHG | WEIGHT: 149 LBS | BODY MASS INDEX: 26.4 KG/M2 | DIASTOLIC BLOOD PRESSURE: 85 MMHG | HEART RATE: 73 BPM | HEIGHT: 63 IN

## 2025-04-04 DIAGNOSIS — K92.1 HEMATOCHEZIA: ICD-10-CM

## 2025-04-04 DIAGNOSIS — K59.00 CONSTIPATION, UNSPECIFIED CONSTIPATION TYPE: ICD-10-CM

## 2025-04-04 DIAGNOSIS — Z12.11 SCREEN FOR COLON CANCER: Primary | ICD-10-CM

## 2025-04-04 LAB
B-HCG UR QL: NEGATIVE
CTP QC/QA: YES

## 2025-04-04 PROCEDURE — 27201089 HC SNARE, DISP (ANY): Performed by: COLON & RECTAL SURGERY

## 2025-04-04 PROCEDURE — 45398 COLONOSCOPY W/BAND LIGATION: CPT | Performed by: COLON & RECTAL SURGERY

## 2025-04-04 PROCEDURE — 37000008 HC ANESTHESIA 1ST 15 MINUTES

## 2025-04-04 PROCEDURE — 63600175 PHARM REV CODE 636 W HCPCS: Performed by: NURSE ANESTHETIST, CERTIFIED REGISTERED

## 2025-04-04 PROCEDURE — 45385 COLONOSCOPY W/LESION REMOVAL: CPT | Mod: 33 | Performed by: COLON & RECTAL SURGERY

## 2025-04-04 PROCEDURE — 81025 URINE PREGNANCY TEST: CPT | Performed by: COLON & RECTAL SURGERY

## 2025-04-04 PROCEDURE — 37000009 HC ANESTHESIA EA ADD 15 MINS

## 2025-04-04 PROCEDURE — 27201022: Performed by: COLON & RECTAL SURGERY

## 2025-04-04 PROCEDURE — 45380 COLONOSCOPY AND BIOPSY: CPT | Mod: 33,59 | Performed by: COLON & RECTAL SURGERY

## 2025-04-04 PROCEDURE — 45385 COLONOSCOPY W/LESION REMOVAL: CPT | Mod: 33,,, | Performed by: COLON & RECTAL SURGERY

## 2025-04-04 PROCEDURE — A4216 STERILE WATER/SALINE, 10 ML: HCPCS | Performed by: NURSE ANESTHETIST, CERTIFIED REGISTERED

## 2025-04-04 PROCEDURE — 25000003 PHARM REV CODE 250: Performed by: COLON & RECTAL SURGERY

## 2025-04-04 PROCEDURE — 27201012 HC FORCEPS, HOT/COLD, DISP: Performed by: COLON & RECTAL SURGERY

## 2025-04-04 PROCEDURE — 88305 TISSUE EXAM BY PATHOLOGIST: CPT | Mod: TC | Performed by: COLON & RECTAL SURGERY

## 2025-04-04 PROCEDURE — 25000003 PHARM REV CODE 250: Performed by: NURSE ANESTHETIST, CERTIFIED REGISTERED

## 2025-04-04 PROCEDURE — 45398 COLONOSCOPY W/BAND LIGATION: CPT | Mod: 51,,, | Performed by: COLON & RECTAL SURGERY

## 2025-04-04 PROCEDURE — 45380 COLONOSCOPY AND BIOPSY: CPT | Mod: 33,XS,, | Performed by: COLON & RECTAL SURGERY

## 2025-04-04 RX ORDER — SODIUM CHLORIDE 0.9 % (FLUSH) 0.9 %
SYRINGE (ML) INJECTION
Status: DISCONTINUED | OUTPATIENT
Start: 2025-04-04 | End: 2025-04-04

## 2025-04-04 RX ORDER — DEXTROMETHORPHAN/PSEUDOEPHED 2.5-7.5/.8
DROPS ORAL
Status: COMPLETED | OUTPATIENT
Start: 2025-04-04 | End: 2025-04-04

## 2025-04-04 RX ORDER — LIDOCAINE HYDROCHLORIDE 10 MG/ML
INJECTION, SOLUTION EPIDURAL; INFILTRATION; INTRACAUDAL; PERINEURAL
Status: DISCONTINUED | OUTPATIENT
Start: 2025-04-04 | End: 2025-04-04

## 2025-04-04 RX ORDER — PROPOFOL 10 MG/ML
VIAL (ML) INTRAVENOUS
Status: DISCONTINUED | OUTPATIENT
Start: 2025-04-04 | End: 2025-04-04

## 2025-04-04 RX ADMIN — PROPOFOL 30 MG: 10 INJECTION, EMULSION INTRAVENOUS at 08:04

## 2025-04-04 RX ADMIN — PROPOFOL 50 MG: 10 INJECTION, EMULSION INTRAVENOUS at 08:04

## 2025-04-04 RX ADMIN — SIMETHICONE 200 MG: 20 SUSPENSION/ DROPS ORAL at 08:04

## 2025-04-04 RX ADMIN — SODIUM CHLORIDE 10 ML: 9 INJECTION INTRAMUSCULAR; INTRAVENOUS; SUBCUTANEOUS at 08:04

## 2025-04-04 RX ADMIN — PROPOFOL 20 MG: 10 INJECTION, EMULSION INTRAVENOUS at 08:04

## 2025-04-04 RX ADMIN — PROPOFOL 60 MG: 10 INJECTION, EMULSION INTRAVENOUS at 08:04

## 2025-04-04 RX ADMIN — LIDOCAINE HYDROCHLORIDE 50 MG: 10 SOLUTION INTRAVENOUS at 08:04

## 2025-04-04 NOTE — ANESTHESIA PREPROCEDURE EVALUATION
2025  Daniela Davis is a 40 y.o., female.    Problem List[1]  Past Medical History:   Diagnosis Date    Headache(784.0)     migraines as a teenager     Past Surgical History:   Procedure Laterality Date     SECTION       SECTION       SECTION       SECTION      TUBAL LIGATION         Pre-op Assessment    I have reviewed the Patient Summary Reports.     I have reviewed the Nursing Notes. I have reviewed the NPO Status.   I have reviewed the Medications.     Review of Systems  Anesthesia Hx:               Denies Personal Hx of Anesthesia complications.                    Social:  Former Smoker, Social Alcohol Use       Hepatic/GI:  Bowel Prep.                   Neurological:      Headaches                                   Results for orders placed or performed during the hospital encounter of 19   EKG 12-lead    Collection Time: 19 10:49 PM    Narrative    Test Reason : R10.9,    Vent. Rate : 082 BPM     Atrial Rate : 082 BPM     P-R Int : 150 ms          QRS Dur : 088 ms      QT Int : 390 ms       P-R-T Axes : 039 036 026 degrees     QTc Int : 455 ms    Normal sinus rhythm  Normal ECG  When compared with ECG of 2014 08:59,  Previous ECG has undetermined rhythm, needs review  Nonspecific T wave abnormality now evident in Inferior leads  Confirmed by MADDY BELTRÁN MD ([id number]) on 6/15/2019 11:46:24 AM    Referred By: [email or web address]   SELF           Confirmed By:MADDY BELTRÁN MD         Physical Exam  General: Well nourished, Cooperative, Alert and Oriented    Airway:  Mallampati: II   Mouth Opening: Normal  TM Distance: Normal  Tongue: Normal  Neck ROM: Normal ROM    Chest/Lungs:  Normal Respiratory Rate    Heart:  Rate: Normal  Rhythm: Regular Rhythm        Anesthesia Plan  Type of Anesthesia, risks & benefits discussed:    Anesthesia Type: MAC, Gen  Natural Airway  Intra-op Monitoring Plan: Standard ASA Monitors  Induction:  IV  Informed Consent: Informed consent signed with the Patient and all parties understand the risks and agree with anesthesia plan.  All questions answered.   ASA Score: 2  Day of Surgery Review of History & Physical: H&P Update referred to the surgeon/provider.    Ready For Surgery From Anesthesia Perspective.     .           [1]   Patient Active Problem List  Diagnosis    Dizziness    Tobacco abuse    Headache    Vision changes    Atypical chest pain    Pre-syncope

## 2025-04-04 NOTE — TRANSFER OF CARE
"Anesthesia Transfer of Care Note    Patient: Daniela Davis    Procedure(s) Performed: * No procedures listed *    Patient location: GI    Anesthesia Type: MAC    Transport from OR: Transported from OR on room air with adequate spontaneous ventilation    Post pain: adequate analgesia    Post assessment: no apparent anesthetic complications    Post vital signs: stable    Level of consciousness: responds to stimulation    Nausea/Vomiting: no nausea/vomiting    Complications: none    Transfer of care protocol was followed      Last vitals: Visit Vitals  /73   Pulse 66   Temp 36.6 °C (97.9 °F)   Resp 20   Ht 5' 3" (1.6 m)   Wt 67.6 kg (149 lb)   SpO2 100%   Breastfeeding No   BMI 26.39 kg/m²     "

## 2025-04-04 NOTE — ANESTHESIA POSTPROCEDURE EVALUATION
Anesthesia Post Evaluation    Patient: Daniela Davis    Procedure(s) Performed: * No procedures listed *    Final Anesthesia Type: MAC      Patient location during evaluation: GI PACU  Patient participation: Yes- Able to Participate  Level of consciousness: awake and alert  Post-procedure vital signs: reviewed and stable  Pain management: adequate  Airway patency: patent    PONV status at discharge: No PONV  Anesthetic complications: no      Cardiovascular status: blood pressure returned to baseline and hemodynamically stable  Respiratory status: unassisted, spontaneous ventilation and room air  Hydration status: euvolemic  Follow-up not needed.              Vitals Value Taken Time   /85 04/04/25 09:12   Temp 36.6 °C (97.9 °F) 04/04/25 08:52   Pulse 73 04/04/25 09:12   Resp 18 04/04/25 09:12   SpO2 100 % 04/04/25 09:12         Event Time   Out of Recovery 09:28:40         Pain/Aleta Score: Aleta Score: 10 (4/4/2025  9:12 AM)

## 2025-04-04 NOTE — BRIEF OP NOTE
[facility] - Endoscopy (Hospital)  Brief Operative Note     SUMMARY     Surgery Date: 4/4/2025     Surgeon: Kevin Solis MD    Pre-op Diagnosis:  Screen for Colon Cancer    Post-op Diagnosis:  Screen for Colon Cancer    Procedure: Colonoscopy    Anesthesia: MAC    Description of the findings of the procedure: polyps removed; hemorrhoids banded    Estimated Blood Loss: minimal         Specimens:   Specimen (24h ago, onward)       Start     Ordered    04/04/25 0832  Specimen to Pathology Gastrointestinal tract  RELEASE UPON ORDERING        References:    Click here for ordering Quick Tip   Question:  Release to patient  Answer:  Immediate    04/04/25 0818

## 2025-04-04 NOTE — LETTER
Daniela Luke [facility]  [address] [place] [address]    3 Day Suprep Instructions for Colonoscopy    Date of procedure: Friday 04/04/2025  Your arrival time will be given to you prior to the day of your procedure.  Your procedure will be performed at [facility] ([facility]). The hospital is located at [address] (off [address]).        As soon as possible:     your prep from pharmacy and over the counter DULCOLAX LAXATIVE TABLETS, GAS-X, MIRALAX, AND MAGNESIUM CITRATE.    Seven (7) days before colonoscopy: Avoid high fiber foods such as whole wheat or whole grain breads or pasta, raw vegetables or fruit with peels, corn, beans, peas, lentils, nuts, seeds, and popcorn.     (Tuesday)Three (3) days before colonoscopy: Begin a full liquid diet. You must only have full liquids for breakfast, lunch, and dinner. Do not consume liquids that are red or purple. No solid foods. You may eat the following items when on a FULL liquid diet: fruit juices (including juices with pulp), custard, pudding, plain ice cream, frozen yogurt, sherbet, fruit ices and popsicles, soup broth, clear sodas, liquid nutritional supplement drinks (Boost, Ensure, or Resource), and tea or coffee with cream or milk.    (Wednesday)Two (2) days before colonoscopy: 8:00 AM, Drink 125 grams of MiraLAX mixed with 32 ounces of Gatorade (no red or purple coloring). Finish the Miralax mixture within 1 hour. Stay on the full liquid diet. No solid food.    (Thursday)On the day before your procedure     What You CAN do:    You may have CLEAR LIQUIDS ONLY (Drink at least 16 glasses (8oz glass)-   see below for list.   Liquids That Are OK to Drink:    Water    Sports drinks (Gatorade, Power-Aid)       Coffee or tea (no cream or nondairy creamer)    Clear juices without pulp (apple, white grape)    Gelatin desserts (no fruit or toppings)    Clear soda (sprite, coke, ginger ale)    Chicken  broth (until 12 midnight the night before procedure)     What You CANNOT do:      Do not EAT solid food, drink milk or anything colored red or purple.    Do not drink alcohol.    Do not take oral medications within 1 hour of starting each dose of prep.    No tobacco products, gum chewing, or candy morning of procedure     PLEASE DISREGARD THE INSERT INSTRUCTIONS FROM THE PHARMACY.  How to take prep:  DOSE 1-Day Before Colonoscopy  6:00 pm Take four (4) Dulcolax (Bisacodyl) Laxative tablets and 1 simethicone (GAS-X) 125 mg capsule with at least 8 ounces or more of clear liquids.    7:00 pm Pour one 6 oz. bottle of prep solution into the mixing container. Add cool water to reach the 16-oz. fill line.  Mix well. Drink ALL the contents in the mixing container. Drink 2 more 16 oz. containers of water. Finish the prep mixture and the 2 glasses of water within 1 hour and 30 minutes. Drink a minimum of four (8 oz) glasses of clear liquids before midnight to stay hydrated.  Mix the prep with Crystal Light (no red or purple flavoring), apple juice, or Gatorade (no red or purple) to improve the flavor.     If you have not had a bowel movement by 10:00 pm, drink one bottle of Magnesium Citrate Oral Saline Laxative Solution 10 oz.    After midnight, nothing to drink or eat except for the bowel prep.     (Friday) DOSE 2-Day of the Colonoscopy  [place] the time you were instructed:2:00 AM     or     5:00AM    For this dose, REPEAT using the second 6 oz prep solution and mixing container. Add cool water to reach the 16-oz. fill line.  Mix well. Drink ALL the contents in the mixing container. Drink 2 more 16 oz. containers of water. Finish the prep mixture and the 2 glasses of water within 1 hour and 30.    After finishing prep, do not drink or eat anything until after the colonoscopy.   You may have a small sip of water with your morning medications. If your stool is brown, orange, or cloudy, your colon is not clean, please call  endoscopy staff at [phone].    Endoscopy Scheduling Department at [phone].  Endoscopy Department at [phone].   On-Call Nurse line at [phone].  Financial Services at [phone].  Frequently Asked Questions- Colonoscopy  What are some tips for preparing for a colonoscopy?  Stay near a toilet after taking the prep, you will have diarrhea and may have cramping with your bowel movements.  For skin irritation or flaring of hemorrhoids from frequent bowel movements and wiping, ease with over-the-counter products like baby wipes, Vaseline, or Tucks pads.  If experiencing nausea from the prep, take a 30-minute break, rinse your mouth, and continue drinking the prep. Dramamine (Meclizine) is available over the counter, take ½ of a tablet (12.5 mg) every 6 hours as needed for nausea. Do not take more than 50 mg in 24 hours.   If a long drive or need a change to the prep direction times, please call the endoscopy department to talk with our staff at [phone].  Females between the ages of 10-55 may be asked for a urine sample (pregnancy test) after you check in for your procedure. Please let staff know before going to the restroom.  Coumadin (WARFARIN), Plavix (CLOPIDOGREL), and Effient (PRASUGREL) MUST be stopped 5 days prior to exam unless discussed with the doctor performing the test. Eliquis (APIXABAN), Savaysa (EDOXABAN), Arixtra (FONDAPARINUX), and Xarelto (RIVAROXABAN) MUST be stopped 2 days prior to exam unless discussed with the doctor performing the test.  Glucagon-like peptide-1 receptor agonists (GLP-1 Yanci) medications (semaglutide -OZEMPIC, RYBELSUS, WEGOVY; Dulaglutide- TRULICITY; Liraglutide- SAXENDA; tirzepatide- MOUNJARO) for weight loss or diabetes, MUST be stopped 7 days prior to exam unless discussed with the doctor performing the test.  Weight loss medications such as Phentermine (Adipex/Lomaira) and Diethylpropion (Amfepraone/Tepanil/Tenuate) should be stopped 7 days  "prior to exam.  You must have a licensed  to bring you home. If using public transportation, you must have an adult come with you.  Do not take any diabetic medications (including insulin) the morning of the exam. If your blood sugar goes below 70, you may drink 2 ounces of clear juice. Wait 15 minutes, then recheck your blood sugar. If it isn't going up, you may drink another 2 ounces of clear juice and contact the On-Call Nurse line at [phone].   Continue to take blood pressure, heart, thyroid, lung, seizure, and psychological medications the morning of procedure.    Do I have to drink all the bowel prep and water?         Yes, in addition to drinking plenty of clear liquids. Drinking plenty of clear liquids helps the prep to work and keep you hydrated. Any clear liquid that isn't red or purple. Drink at least 12 (8 oz) glasses of clear liquids or three 32 oz Gatorades by 5PM the day before your procedure. Drink   at least 1 (8 oz) glass of liquid every hour while you're awake. After the first dose of prep, drink an additional four (8 oz) glasses of clear liquids before midnight.  Clear liquids include: Coffee (no cream/milk)  Water  Tea  Clear carbonated drinks (ginger ale, Sprite, or sparkling water)  Gelatin/Jello  Apple, White grape, White Cranberry Juice  Beef/chicken broth/bouillon  Gatorade/Powerade  Lemonade/limeade  Snowballs/slushes  Popsicles  No "Energy" beverages or alcohol. No juices with pulp.  Do not drink red or purple liquids because the dye will stain the walls of your colon and may appear to be a bleed/abnormality.        The first dose of the prep starts to clean out the stool from your colon. The second dose of the prep helps to fully clean out the colon before the procedure.    What are some ways to improve the taste of the prep?  Put the prep solution in the refrigerator to chill before beginning the prep, tastes best cold.  Drinking the prep with a straw.    How will I know " that the bowel prep is working? Why is it important to have a good prep or a clean colon before the procedure?  bowel movements are clear or clear yellow.   Colon should be clean as possible so the doctor can see the walls of the colon and find tiny polyps or colon cancer.  If there is stool in the colon, the doctor cannot move the endoscopy scope through the entire colon and the procedure will be canceled.  If a history of poor bowel prep, constipation, opiate medication use, diabetes, or kidney disease, please let us know; you may require an extended bowel prep to clean your colon.  If brown (including dark orange and cloudy) bowel movements on the morning of your procedure, please call the endoscopy department at [phone] (M-F from 6AM-3PM).      What should I bring to my appointment?  -List of your current medications                                              -Clothing that is easy to put back on after the procedure        -Method of payment        -Your ID         - Insurance card     Leave jewelry and other valuables at home.   Please plan to be at the hospital for 3 - 4 hours.    Why doesn't my appointment time show up in [facility] or [facility]?  [facility] and [facility] are not set up to show surgical times. You will be called the day before the procedure and told your arrival time.    Where is the Endoscopy department located?  [facility] ([facility]) hospital is located at [address], off [address], [address] ([facility] [address] beds at the [facility]). Once on [address], [facility] is the second building (Entrance [address]) on the left. Check in for your procedure on the 1st floor at Registration.

## 2025-04-04 NOTE — INTERVAL H&P NOTE
The patient has been examined and the H&P has been reviewed:    I concur with the findings and no changes have occurred since H&P was written.    - Ok to proceed to endoscopy suite for colonoscopy and possible banding  - Consent obtained. All risks, benefits and alternatives fully explained to patient, including but not limited to bleeding, infection, perforation, anorectal pain, malpositioned polyps and missed polyps. All questions appropriately answered to patient's satisfaction. Consent signed and placed on chart.

## 2025-04-07 VITALS
WEIGHT: 149 LBS | RESPIRATION RATE: 18 BRPM | HEART RATE: 73 BPM | SYSTOLIC BLOOD PRESSURE: 114 MMHG | DIASTOLIC BLOOD PRESSURE: 85 MMHG | HEIGHT: 63 IN | BODY MASS INDEX: 26.4 KG/M2 | TEMPERATURE: 98 F | OXYGEN SATURATION: 100 %

## 2025-04-07 LAB
ESTROGEN SERPL-MCNC: NORMAL PG/ML
INSULIN SERPL-ACNC: NORMAL U[IU]/ML
LAB AP CLINICAL INFORMATION: NORMAL
LAB AP GROSS DESCRIPTION: NORMAL
LAB AP PERFORMING LOCATION(S): NORMAL
LAB AP REPORT FOOTNOTES: NORMAL

## 2025-04-08 ENCOUNTER — RESULTS FOLLOW-UP (OUTPATIENT)
Dept: SURGERY | Facility: HOSPITAL | Age: 40
End: 2025-04-08
Payer: COMMERCIAL

## 2025-04-08 DIAGNOSIS — K59.09 CHRONIC CONSTIPATION: Primary | ICD-10-CM

## 2025-06-19 ENCOUNTER — TELEPHONE (OUTPATIENT)
Dept: INTERNAL MEDICINE | Facility: CLINIC | Age: 40
End: 2025-06-19
Payer: COMMERCIAL

## 2025-06-19 NOTE — TELEPHONE ENCOUNTER
Copied from CRM #[id number]. Topic: Appointments - Appointment Access  >> Jun 19, 2025  2:19 PM Germania wrote:  Type: Needs Medical Advice  Who Called:  Pt    Best Call Back Number: [phone]    Additional Information: Pt was trying to establish care with dr garcia, please call to offer her next available with dr garcia please advise

## 2025-06-23 ENCOUNTER — PATIENT MESSAGE (OUTPATIENT)
Dept: SURGERY | Facility: CLINIC | Age: 40
End: 2025-06-23
Payer: COMMERCIAL

## 2025-06-23 ENCOUNTER — TELEPHONE (OUTPATIENT)
Dept: GASTROENTEROLOGY | Facility: CLINIC | Age: 40
End: 2025-06-23
Payer: COMMERCIAL

## 2025-06-23 NOTE — TELEPHONE ENCOUNTER
Spoke with patient and scheduled a virtual visit as the soonest I can get her in. Patient voices understanding and has been added to the wait-list should someone cancel or reschedule.     ----- Message from MILLER Osman sent at 6/23/2025  3:08 PM CDT -----  Good afternoon,     Is there anyway y'all could get this pt in sooner? She was scoped and banded by Dr. Solis in early April and is having bleeding again. She states she has been following our bowel regimen but is still having issues. I think if she gets her constipation under control, this would help alleviate the bleeding. Totally understand if she can't be seen sooner, just asking!    Jacqui

## 2025-07-15 NOTE — PROGRESS NOTES
Patient ID: Daniela Davis is a 40 y.o. female.    Chief Complaint: Establish Care      History of Present Illness    - Ms. Davis presents to establish care with a PCP and to address multiple ongoing health concerns, including kidney stones, GI issues, and potential autoimmune symptoms.    Ms. Davis is a 40-year-old female with multiple health issues over the past year. She currently has 7 kidney stones, discovered through CT after symptoms consistent with a UTI in December. This is an increase from a single kidney stone she had 6 years ago. She has intermittent pain in her kidneys and bladder spasms.    She has had GI issues, including constipation, for approximately 8 years since her gallbladder removal during her fourth pregnancy. Recently, she has been passing bloody clots for 4 weeks, which started about 2 months after a colonoscopy. The colonoscopy revealed 3 polyps: 2 precancerous and 1 inflammatory. She had 3 hemorrhoids banded, initially thought to be the cause of rectal bleeding.    She reports extreme fatigue, with significant difficulty staying awake. 2 days ago, she unexpectedly fell asleep at her desk and woke up 1.5 hours later.    She has had Raynaud's syndrome symptoms since her teens, now occurring about 5 times daily, even without cold exposure.    A dermatologist evaluated her for giant cystic acne and prescribed spironolactone. She has severe headaches and ear pain on one side of her face. A wisdom tooth removal did not alleviate the pain. An ENT diagnosed her with TMJ after ruling out ear issues and recommended Botox treatment, which she is hesitant about.    She underwent myofascial release therapy and dry needling for 4 weeks, providing some facial pain relief, but stopped due to worsening acne.    She reports significant weight fluctuations, losing 18 lbs in 7 days due to stress in November, and now has weight changes of 10-15 lbs every couple of weeks.    She mentions feeling  randomly flushed, with her face feeling extremely hot, and has temperature regulation issues.    She has a family history of thyroid concerns, with her mother, sisters, and grandmother all diagnosed with hypothyroidism. Twice a year, she feels something stuck in her throat, but previous thyroid tests have been normal.    She denies formal diagnoses of Raynaud's syndrome or hypothyroidism.    - X-ray: Completed in December, showed kidney stones  - CT: Completed after X-ray, revealed 7 kidney stones (4 in the left kidney, 3 in the right kidney), non-obstructive, approximately 3 mm in size      ROS:  General: denies fever, complains of chills, complains of fatigue, complains of weight gain, complains of weight loss, complains of change in weight, complains of hot flashes  Eyes: denies vision changes, denies redness, denies discharge  ENT: complains of ear pain, denies nasal congestion, denies sore throat, complains of sense of lump/mass in throat when swallowing  Cardiovascular: denies chest pain, denies palpitations, denies lower extremity edema  Respiratory: denies cough, denies shortness of breath  Gastrointestinal: denies abdominal pain, denies nausea, denies vomiting, denies diarrhea, complains of constipation, complains of blood in stool, complains of rectal bleeding, complains of bright red blood per rectum  Genitourinary: denies dysuria, denies hematuria, denies frequency, complains of flank pain  Musculoskeletal: denies joint pain, denies muscle pain, complains of muscle tension  Skin: denies rash, denies lesion, complains of acne  Neurological: complains of headache, denies dizziness, denies numbness, denies tingling, complains of loss of consciousness  Psychiatric: denies anxiety, denies depression, denies sleep difficulty            Physical Exam    General: In no acute distress.  Head: Normocephalic. Non traumatic.  Eyes: PERRLA. EOMs full. Conjunctivae clear. Fundi grossly normal.  Ears: EACs clear. TMs  "normal.  Nose: Mucosa pink. Mucosa moist. No obstruction.  Throat: Clear. No exudates. No lesions.  Neck: Supple. No masses. No thyromegaly. No bruits.  Chest: Lungs clear. No rales. No rhonchi. No wheezes.  Heart: RRR. No murmurs. No rubs. No gallops.  Abdomen: Soft. No tenderness. No masses. BS normal.  : Normal external genitalia. No lesions. No discharge. No hernias  noted.  Back: Normal curvature. No scoliosis. No tenderness.  Extremities: Warm. Well perfused. No upper extremity edema. No lower extremity edema. FROM. No deformities. No joint erythema.  Neuro: No focal deficits appreciated. Good muscle tone. Normal response to visual stimuli. Normal response to auditory stimuli.  Skin: Normal. No rashes. No lesions noted.          Current Medications[1]            VITAL SIGNS:  Vitals:    07/16/25 1349   BP: 110/60   BP Location: Left arm   Pulse: 72   Temp: 97.1 °F (36.2 °C)   TempSrc: Tympanic   SpO2: 98%   Weight: 74 kg (163 lb 2.3 oz)   Height: 5' 3" (1.6 m)       CURRENT BMI:   Body mass index is 28.9 kg/m².    LABS REVIEWED:    CBC:  Lab Results   Component Value Date    WBC 8.28 05/12/2022    RBC 4.34 05/12/2022    HGB 13.6 05/12/2022    HCT 39.2 05/12/2022    MCV 90 05/12/2022    MCH 31.3 (H) 05/12/2022    MCHC 34.7 05/12/2022    RDW 11.4 (L) 05/12/2022     05/12/2022    MPV 11.2 05/12/2022    GRAN 5.2 05/12/2022    GRAN 63.4 05/12/2022    LYMPH 2.1 05/12/2022    LYMPH 25.6 05/12/2022    MONO 0.6 05/12/2022    MONO 7.6 05/12/2022    EOS 0.2 05/12/2022    BASO 0.12 05/12/2022    EOSINOPHIL 1.8 05/12/2022    BASOPHIL 1.4 05/12/2022       CHEMISTRY:  Sodium   Date Value Ref Range Status   06/13/2019 138 136 - 145 mmol/L Final     Potassium   Date Value Ref Range Status   06/13/2019 4.0 3.5 - 5.1 mmol/L Final     Chloride   Date Value Ref Range Status   06/13/2019 106 95 - 110 mmol/L Final     CO2   Date Value Ref Range Status   06/13/2019 23 23 - 29 mmol/L Final     Glucose   Date Value Ref Range " "Status   06/13/2019 95 70 - 110 mg/dL Final     BUN   Date Value Ref Range Status   06/13/2019 15 6 - 20 mg/dL Final     Creatinine   Date Value Ref Range Status   06/13/2019 0.8 0.5 - 1.4 mg/dL Final     Calcium   Date Value Ref Range Status   06/13/2019 9.1 8.7 - 10.5 mg/dL Final     Total Protein   Date Value Ref Range Status   06/13/2019 6.9 6.0 - 8.4 g/dL Final     Albumin   Date Value Ref Range Status   06/13/2019 4.0 3.5 - 5.2 g/dL Final     Total Bilirubin   Date Value Ref Range Status   06/13/2019 1.0 0.1 - 1.0 mg/dL Final     Comment:     For infants and newborns, interpretation of results should be based  on gestational age, weight and in agreement with clinical  observations.  Premature Infant recommended reference ranges:  Up to 24 hours.............<8.0 mg/dL  Up to 48 hours............<12.0 mg/dL  3-5 days..................<15.0 mg/dL  6-29 days.................<15.0 mg/dL       Alkaline Phosphatase   Date Value Ref Range Status   06/13/2019 54 (L) 55 - 135 U/L Final     AST   Date Value Ref Range Status   06/13/2019 20 10 - 40 U/L Final     ALT   Date Value Ref Range Status   06/13/2019 20 10 - 44 U/L Final     Anion Gap   Date Value Ref Range Status   06/13/2019 9 8 - 16 mmol/L Final       LIPID PANEL:  No results found for: "CHOL"  No results found for: "TRIG"  No results found for: "HDL"  No results found for: "LDLCALC"    THYROID:  Lab Results   Component Value Date    TSH 1.267 05/12/2022    L8TJZTN 90 05/12/2022    FREET4 0.97 05/12/2022       DIABETES:  No results found for: "LABA1C", "HGBA1C"  No results found for: "MICALBCREAT"    Assessment and Plan     Assessment & Plan    N20.0 Renal calculi  K62.5 Bright red blood per rectum  S03.00XA Dislocation of temporomandibular joint, initial encounter  R68.89 Fluctuation of weight  R53.82 Chronic fatigue    RENAL CALCULI:  - Considered autoimmune etiology for multiple symptoms including kidney stones, GI issues, skin problems, and fatigue.  - " Ordered autoimmune panel as part of 18 labs to investigate multiple symptoms and potential causes.  - Kidney stones likely genetic, currently non-obstructive and small enough to pass without intervention.  - Discussed kidney stone management, explaining that small non-obstructive stones are typically left to pass naturally.    BRIGHT RED BLOOD PER RECTUM:  - Considered autoimmune etiology for multiple symptoms including kidney stones, GI issues, skin problems, and fatigue.  - Ordered autoimmune panel as part of 18 labs to investigate multiple symptoms and potential causes.  - Assessed for anemia due to rectal bleeding and fatigue.  - Ordered workup for anemia as part of 18 labs to investigate multiple symptoms and potential causes.  - Rectal bleeding possibly due to anal fissure rather than hemorrhoids.  - Provided information on anal fissures as a potential cause of rectal bleeding.    DISLOCATION OF TEMPOROMANDIBULAR JOINT, INITIAL ENCOUNTER:  - High stress levels may be contributing to TMJ and other symptoms.  - Explained Botox treatment for TMJ, clarifying it is a targeted injection into specific muscles rather than cosmetic use.    FLUCTUATION OF WEIGHT:  - Evaluated for potential thyroid dysfunction given family history and intermittent symptoms, despite previous normal results.  - Ordered comprehensive thyroid panel beyond standard screening as part of 18 labs to investigate multiple symptoms and potential causes.  - Hormonal imbalance possible given age and symptoms.    CHRONIC FATIGUE:  - Considered autoimmune etiology for multiple symptoms including kidney stones, GI issues, skin problems, and fatigue.  - Ordered autoimmune panel as part of 18 labs to investigate multiple symptoms and potential causes.  - Evaluated for potential thyroid dysfunction given family history and intermittent symptoms, despite previous normal results.  - Ordered comprehensive thyroid panel beyond standard screening as part of 18  labs to investigate multiple symptoms and potential causes.  - Assessed for anemia due to rectal bleeding and fatigue.  - Ordered workup for anemia as part of 18 labs to investigate multiple symptoms and potential causes.  - High stress levels may be contributing to TMJ and other symptoms.  - Hormonal imbalance possible given age and symptoms.  - Follow up for review of lab results and further evaluation if needed.          1. Renal calculi  Comments:  Reviewed CT.  We will order parathyroid hormone and calcium levels.  Stones are nonobstructive, anticipate passing of stones  Orders:  -     PTH, Intact; Future; Expected date: 07/16/2025  -     Calcium, Ionized; Future; Expected date: 07/16/2025    2. Bright red blood per rectum  Comments:  Suspect anal fissure.  We will draw iron levels for monitoring  Orders:  -     Ferritin; Future; Expected date: 07/16/2025  -     Iron and TIBC; Future; Expected date: 07/16/2025    3. Dislocation of temporomandibular joint, initial encounter  Comments:  Explained stress and TMJ association.  Will order cortisol levels  Orders:  -     Cyclic Citrullinated Peptide Antibody, IgG; Future; Expected date: 07/16/2025  -     Rheumatoid Factor; Future; Expected date: 07/16/2025  -     Anti-Scleroderma Antibody; Future; Expected date: 07/16/2025  -     Anti-Centromere Antibody; Future; Expected date: 07/16/2025  -     Sjogrens syndrome-B extractable nuclear antibody; Future; Expected date: 07/16/2025  -     Sjogrens syndrome-A extractable nuclear antibody; Future; Expected date: 07/16/2025  -     Anti-DNA Ab, Double-Stranded; Future; Expected date: 07/16/2025  -     Anti-Smith Antibody; Future; Expected date: 07/16/2025  -     Cortisol; Future; Expected date: 07/16/2025  -     TSH; Future; Expected date: 07/16/2025  -     T4, Free; Future; Expected date: 07/16/2025  -     T3; Future; Expected date: 07/16/2025    4. Fluctuation of weight  Comments:  Will order autoimmune panel  Orders:  -      Cyclic Citrullinated Peptide Antibody, IgG; Future; Expected date: 07/16/2025  -     Rheumatoid Factor; Future; Expected date: 07/16/2025  -     Anti-Scleroderma Antibody; Future; Expected date: 07/16/2025  -     Anti-Centromere Antibody; Future; Expected date: 07/16/2025  -     Sjogrens syndrome-B extractable nuclear antibody; Future; Expected date: 07/16/2025  -     Sjogrens syndrome-A extractable nuclear antibody; Future; Expected date: 07/16/2025  -     Anti-DNA Ab, Double-Stranded; Future; Expected date: 07/16/2025  -     Anti-Smith Antibody; Future; Expected date: 07/16/2025  -     Cortisol; Future; Expected date: 07/16/2025  -     TSH; Future; Expected date: 07/16/2025  -     T4, Free; Future; Expected date: 07/16/2025  -     T3; Future; Expected date: 07/16/2025    5. Chronic fatigue  Comments:  Will order anemia panel  Orders:  -     Ferritin; Future; Expected date: 07/16/2025  -     Iron and TIBC; Future; Expected date: 07/16/2025  -     Vitamin B12; Future; Expected date: 07/16/2025  -     Folate; Future; Expected date: 07/16/2025           No follow-ups on file.  47 of total time spent on the encounter, which includes face to face time and non-face to face time preparing to see the patient. This includes obtaining and/or reviewing separately obtained history, performing a medically appropriate examination and/or evaluation, and counseling and educating the patient/family/caregiver. Includes documenting clinical information in the electronic or other health record, independently interpreting results (not separately reported) and communicating results to the patient/family/caregiver, with care coordination (not separately reported). Medications, tests and/or procedures ordered as necessary along with referring and communicating with other health professionals (when not separately reported).      This note was generated with the assistance of ambient listening technology. Verbal consent was obtained by  the patient and accompanying visitor(s) for the recording of patient appointment to facilitate this note. I attest to having reviewed and edited the generated note for accuracy, though some syntax or spelling errors may persist. Please contact the author of this note for any clarification.            [1]   Current Outpatient Medications:     cetirizine (ZYRTEC) 10 MG tablet, Take 10 mg by mouth once daily., Disp: , Rfl:     Lactobacillus acidophilus (PROBIOTIC ACIDOPHILUS ORAL), Take by mouth once daily., Disp: , Rfl:     magnesium aspart,citrate,oxide (TRIPLE MAGNESIUM COMPLEX ORAL), Take 420 mg by mouth every evening., Disp: , Rfl:     PSYLLIUM HUSK, ASPARTAME, ORAL, Take 500 mg by mouth once daily., Disp: , Rfl:     spironolactone (ALDACTONE) 100 MG tablet, Take 100 mg by mouth once daily. Pt takes for cystic acne., Disp: , Rfl:

## 2025-07-16 ENCOUNTER — LAB VISIT (OUTPATIENT)
Dept: LAB | Facility: HOSPITAL | Age: 40
End: 2025-07-16
Attending: INTERNAL MEDICINE
Payer: COMMERCIAL

## 2025-07-16 ENCOUNTER — OFFICE VISIT (OUTPATIENT)
Dept: FAMILY MEDICINE | Facility: CLINIC | Age: 40
End: 2025-07-16
Payer: COMMERCIAL

## 2025-07-16 VITALS
OXYGEN SATURATION: 98 % | HEART RATE: 72 BPM | BODY MASS INDEX: 28.9 KG/M2 | TEMPERATURE: 97 F | DIASTOLIC BLOOD PRESSURE: 60 MMHG | WEIGHT: 163.13 LBS | HEIGHT: 63 IN | SYSTOLIC BLOOD PRESSURE: 110 MMHG

## 2025-07-16 DIAGNOSIS — K62.5 BRIGHT RED BLOOD PER RECTUM: ICD-10-CM

## 2025-07-16 DIAGNOSIS — S03.00XA DISLOCATION OF TEMPOROMANDIBULAR JOINT, INITIAL ENCOUNTER: ICD-10-CM

## 2025-07-16 DIAGNOSIS — N20.0 RENAL CALCULI: Primary | ICD-10-CM

## 2025-07-16 DIAGNOSIS — N20.0 RENAL CALCULI: ICD-10-CM

## 2025-07-16 DIAGNOSIS — R53.82 CHRONIC FATIGUE: ICD-10-CM

## 2025-07-16 DIAGNOSIS — R68.89 FLUCTUATION OF WEIGHT: ICD-10-CM

## 2025-07-16 LAB
CA-I BLD-SCNC: 1.27 MMOL/L (ref 1.06–1.42)
CORTIS SERPL-MCNC: 8.6 UG/DL
FERRITIN SERPL-MCNC: 31 NG/ML (ref 20–300)
IRON SATN MFR SERPL: 33 % (ref 20–50)
IRON SERPL-MCNC: 134 UG/DL (ref 30–160)
RHEUMATOID FACT SERPL-ACNC: <13 IU/ML
T3FREE SERPL-MCNC: 88 NG/DL (ref 60–180)
T4 FREE SERPL-MCNC: 0.99 NG/DL (ref 0.71–1.51)
TIBC SERPL-MCNC: 407 UG/DL (ref 250–450)
TRANSFERRIN SERPL-MCNC: 275 MG/DL (ref 200–375)
TSH SERPL-ACNC: 1.37 UIU/ML (ref 0.4–4)

## 2025-07-16 PROCEDURE — 84443 ASSAY THYROID STIM HORMONE: CPT

## 2025-07-16 PROCEDURE — 82330 ASSAY OF CALCIUM: CPT

## 2025-07-16 PROCEDURE — 86431 RHEUMATOID FACTOR QUANT: CPT

## 2025-07-16 PROCEDURE — 86225 DNA ANTIBODY NATIVE: CPT

## 2025-07-16 PROCEDURE — 3008F BODY MASS INDEX DOCD: CPT | Mod: CPTII,S$GLB,, | Performed by: INTERNAL MEDICINE

## 2025-07-16 PROCEDURE — 83516 IMMUNOASSAY NONANTIBODY: CPT

## 2025-07-16 PROCEDURE — 99999 PR PBB SHADOW E&M-EST. PATIENT-LVL III: CPT | Mod: PBBFAC,,, | Performed by: INTERNAL MEDICINE

## 2025-07-16 PROCEDURE — 84480 ASSAY TRIIODOTHYRONINE (T3): CPT

## 2025-07-16 PROCEDURE — 86235 NUCLEAR ANTIGEN ANTIBODY: CPT | Mod: 59

## 2025-07-16 PROCEDURE — 86235 NUCLEAR ANTIGEN ANTIBODY: CPT

## 2025-07-16 PROCEDURE — 83970 ASSAY OF PARATHORMONE: CPT

## 2025-07-16 PROCEDURE — 3074F SYST BP LT 130 MM HG: CPT | Mod: CPTII,S$GLB,, | Performed by: INTERNAL MEDICINE

## 2025-07-16 PROCEDURE — 36415 COLL VENOUS BLD VENIPUNCTURE: CPT | Mod: PO

## 2025-07-16 PROCEDURE — 86200 CCP ANTIBODY: CPT

## 2025-07-16 PROCEDURE — 84439 ASSAY OF FREE THYROXINE: CPT

## 2025-07-16 PROCEDURE — 82746 ASSAY OF FOLIC ACID SERUM: CPT

## 2025-07-16 PROCEDURE — 1159F MED LIST DOCD IN RCRD: CPT | Mod: CPTII,S$GLB,, | Performed by: INTERNAL MEDICINE

## 2025-07-16 PROCEDURE — 82533 TOTAL CORTISOL: CPT

## 2025-07-16 PROCEDURE — 82607 VITAMIN B-12: CPT

## 2025-07-16 PROCEDURE — 3078F DIAST BP <80 MM HG: CPT | Mod: CPTII,S$GLB,, | Performed by: INTERNAL MEDICINE

## 2025-07-16 PROCEDURE — 83540 ASSAY OF IRON: CPT

## 2025-07-16 PROCEDURE — 99204 OFFICE O/P NEW MOD 45 MIN: CPT | Mod: S$GLB,,, | Performed by: INTERNAL MEDICINE

## 2025-07-16 PROCEDURE — 82728 ASSAY OF FERRITIN: CPT

## 2025-07-17 ENCOUNTER — PATIENT MESSAGE (OUTPATIENT)
Dept: SURGERY | Facility: CLINIC | Age: 40
End: 2025-07-17
Payer: COMMERCIAL

## 2025-07-17 ENCOUNTER — OFFICE VISIT (OUTPATIENT)
Dept: GASTROENTEROLOGY | Facility: CLINIC | Age: 40
End: 2025-07-17
Payer: COMMERCIAL

## 2025-07-17 ENCOUNTER — RESULTS FOLLOW-UP (OUTPATIENT)
Dept: FAMILY MEDICINE | Facility: CLINIC | Age: 40
End: 2025-07-17
Payer: COMMERCIAL

## 2025-07-17 VITALS — HEIGHT: 63 IN | WEIGHT: 163.13 LBS | BODY MASS INDEX: 28.9 KG/M2

## 2025-07-17 DIAGNOSIS — Z86.0100 HISTORY OF COLON POLYPS: ICD-10-CM

## 2025-07-17 DIAGNOSIS — K59.09 CHRONIC CONSTIPATION: ICD-10-CM

## 2025-07-17 DIAGNOSIS — K64.9 HEMORRHOIDS, UNSPECIFIED HEMORRHOID TYPE: Primary | ICD-10-CM

## 2025-07-17 DIAGNOSIS — K62.5 RECTAL BLEEDING: ICD-10-CM

## 2025-07-17 LAB
CYCLIC CITRULLINATED PEPTIDE (CCP) (OHS): <0.5 U/ML
DSDNA ANTIBODY (OHS): NORMAL
DSDNA ANTIBODY TITER (OHS): NORMAL
FOLATE SERPL-MCNC: 9 NG/ML (ref 4–24)
PTH-INTACT SERPL-MCNC: 22.5 PG/ML (ref 9–77)
SM  ANTIBODY (OHS): 0.1 RATIO
SM INTERPRETATION (OHS): NEGATIVE
SSA  ANTIBODY (OHS): 0.07 RATIO (ref 0–0.99)
SSA INTERPRETATION (OHS): NEGATIVE
SSB  ANTIBODY (OHS): 0.07 RATIO
SSB INTERPRETATION (OHS): NEGATIVE
VIT B12 SERPL-MCNC: 539 PG/ML (ref 210–950)

## 2025-07-17 NOTE — PROGRESS NOTES
[facility]  Gastroenterology    Patient evaluated at the request of Nika Dumont PA-C  [id number] [place] [address] [place] [id number]    PCP: Beryl Cordova, DO    7/17/25    HPI       Colonoscopy     Additional comments: Follow up          Last edited by Marlena Herman MA on 7/17/2025  9:51 AM.        The patient location is: Home  The chief complaint leading to consultation is: Chronic Constipation    Visit type: audiovisual    Face to Face time with patient: 20 minutes of total time spent on the encounter, which includes face to face time and non-face to face time preparing to see the patient (eg, review of tests), Obtaining and/or reviewing separately obtained history, Documenting clinical information in the electronic or other health record, Independently interpreting results (not separately reported) and communicating results to the patient/family/caregiver, or Care coordination (not separately reported).         Each patient to whom he or she provides medical services by telemedicine is:  (1) informed of the relationship between the physician and patient and the respective role of any other health care provider with respect to management of the patient; and (2) notified that he or she may decline to receive medical services by telemedicine and may withdraw from such care at any time.    Notes:       Subjective:   Daniela Davis is a 40 y.o. female here for evaluation of chronic constipation since 2017. Also was having some rectal bleeding and found to have constipation on CT. In April, went to see [facility] and had colonoscopy performed- few polyps removed with recall of 5 years recommended. Also had some internal hemorrhoids that were banded. Reports that about two months later, she started having rectal bleeding again except more and in clots with mucus. Happening about 5x/day. Takes colace and Miralax every few days. Miralax works when she takes it. Also has tried  psyllium but not sure that it helps.       Past Medical History:   Diagnosis Date    Headache(784.0)     migraines as a teenager       Past Surgical History:   Procedure Laterality Date     SECTION       SECTION       SECTION       SECTION      TUBAL LIGATION         Medications Ordered Prior to Encounter[1]    Review of patient's allergies indicates:  No Known Allergies    Social History[2]    Family History   Problem Relation Name Age of Onset    Hypothyroidism Mother      Diabetes Father      Hypothyroidism Sister      Hypothyroidism Maternal Grandmother         Review of Systems   Constitutional:  Negative for appetite change, fever and unexpected weight change.   HENT:  Negative for postnasal drip, rhinorrhea, sneezing, sore throat and trouble swallowing.    Eyes:  Negative for visual disturbance.   Respiratory:  Negative for cough, shortness of breath and wheezing.    Cardiovascular:  Negative for chest pain, palpitations and leg swelling.   Gastrointestinal:  Positive for anal bleeding and constipation. Negative for abdominal pain, blood in stool, diarrhea, nausea and vomiting.   Genitourinary:  Negative for dysuria.   Musculoskeletal:  Negative for arthralgias, joint swelling and myalgias.   Skin:  Negative for color change, pallor and rash.   Neurological:  Negative for weakness, light-headedness, numbness and headaches.   Hematological:  Negative for adenopathy. Does not bruise/bleed easily.   Psychiatric/Behavioral:  Negative for agitation.        Objective:   Vitals: There were no vitals filed for this visit.    Physical Exam Unable to perform due to video visit    IMPRESSION     Problem List Items Addressed This Visit    None  Visit Diagnoses         Hemorrhoids, unspecified hemorrhoid type    -  Primary      Chronic constipation          History of colon polyps          Rectal bleeding                PLANS:    - Recommend to take Miralax daily PRN to maintain regular and  soft bowel movements  - Discussed if the above fails to work, then can discuss prescription medications such as Linzess or Amitiza  - Recent colonoscopy in 04/2025 showed internal hemorrhoids that were banded, now with recurrent bleeding that seems to more severe. Offered flex sig but patient doesn't think she can go in for another procedure right now  - Recent iron labs ok  - Recommend she follow back with CRS so they can look in their clinic and re-address hemorrhoids if needed    Hemorrhoids, unspecified hemorrhoid type    Chronic constipation  -     Ambulatory referral/consult to Gastroenterology    History of colon polyps    Rectal bleeding      Risa Tay MD  Gastroenterology and Hepatology             [1]   Current Outpatient Medications on File Prior to Visit   Medication Sig Dispense Refill    cetirizine (ZYRTEC) 10 MG tablet Take 10 mg by mouth once daily.      Lactobacillus acidophilus (PROBIOTIC ACIDOPHILUS ORAL) Take by mouth once daily.      magnesium aspart,citrate,oxide (TRIPLE MAGNESIUM COMPLEX ORAL) Take 420 mg by mouth every evening.      PSYLLIUM HUSK, ASPARTAME, ORAL Take 500 mg by mouth once daily.      spironolactone (ALDACTONE) 100 MG tablet Take 100 mg by mouth once daily. Pt takes for cystic acne.       No current facility-administered medications on file prior to visit.   [2]   Social History  Socioeconomic History    Marital status:    Tobacco Use    Smoking status: Former     Current packs/day: 0.50     Types: Cigarettes     Passive exposure: Never    Smokeless tobacco: Never   Substance and Sexual Activity    Alcohol use: Yes     Comment: occasional     Drug use: No    Sexual activity: Yes     Partners: Male     Birth control/protection: See Surgical Hx     Social Drivers of Health     Financial Resource Strain: Low Risk  (3/7/2025)    Overall Financial Resource Strain (CARDIA)     Difficulty of Paying Living Expenses: Not hard at all   Food Insecurity: No Food Insecurity  (3/7/2025)    Hunger Vital Sign     Worried About Running Out of Food in the Last Year: Never true     Ran Out of Food in the Last Year: Never true   Transportation Needs: No Transportation Needs (3/7/2025)    PRAPARE - Transportation     Lack of Transportation (Medical): No     Lack of Transportation (Non-Medical): No   Physical Activity: Sufficiently Active (3/7/2025)    Exercise Vital Sign     Days of Exercise per Week: 6 days     Minutes of Exercise per Session: 60 min   Stress: Stress Concern Present (3/7/2025)    [employer] - Occupational Stress Questionnaire     Feeling of Stress : To some extent   Housing Stability: Low Risk  (3/7/2025)    Housing Stability Vital Sign     Unable to Pay for Housing in the Last Year: No     Number of Times Moved in the Last Year: 0     Homeless in the Last Year: No

## 2025-07-18 LAB — CENTROMERE IGG SER-ACNC: <0.2 U

## 2025-07-21 LAB — W SCLERODERMA (SCL-70) ANTIBODY: <0.6 U/ML

## 2025-07-22 DIAGNOSIS — N95.1 PERIMENOPAUSE: Primary | ICD-10-CM

## 2025-07-24 ENCOUNTER — PATIENT MESSAGE (OUTPATIENT)
Dept: GASTROENTEROLOGY | Facility: CLINIC | Age: 40
End: 2025-07-24
Payer: COMMERCIAL

## 2025-07-25 ENCOUNTER — TELEPHONE (OUTPATIENT)
Dept: PREADMISSION TESTING | Facility: HOSPITAL | Age: 40
End: 2025-07-25
Payer: COMMERCIAL

## 2025-07-25 ENCOUNTER — LAB VISIT (OUTPATIENT)
Dept: LAB | Facility: HOSPITAL | Age: 40
End: 2025-07-25
Attending: INTERNAL MEDICINE
Payer: COMMERCIAL

## 2025-07-25 DIAGNOSIS — K62.5 RECTAL BLEEDING: Primary | ICD-10-CM

## 2025-07-25 DIAGNOSIS — R53.82 CHRONIC FATIGUE: ICD-10-CM

## 2025-07-25 LAB
ABSOLUTE EOSINOPHIL (OHS): 0.26 K/UL
ABSOLUTE MONOCYTE (OHS): 0.54 K/UL (ref 0.3–1)
ABSOLUTE NEUTROPHIL COUNT (OHS): 3.93 K/UL (ref 1.8–7.7)
ALBUMIN SERPL BCP-MCNC: 4.7 G/DL (ref 3.5–5.2)
ALP SERPL-CCNC: 46 UNIT/L (ref 40–150)
ALT SERPL W/O P-5'-P-CCNC: 21 UNIT/L (ref 0–55)
ANION GAP (OHS): 8 MMOL/L (ref 8–16)
AST SERPL-CCNC: 26 UNIT/L (ref 0–50)
BASOPHILS # BLD AUTO: 0.13 K/UL
BASOPHILS NFR BLD AUTO: 1.9 %
BILIRUB SERPL-MCNC: 0.7 MG/DL (ref 0.1–1)
BUN SERPL-MCNC: 16 MG/DL (ref 6–20)
CALCIUM SERPL-MCNC: 9.5 MG/DL (ref 8.7–10.5)
CHLORIDE SERPL-SCNC: 106 MMOL/L (ref 95–110)
CO2 SERPL-SCNC: 24 MMOL/L (ref 23–29)
CREAT SERPL-MCNC: 1 MG/DL (ref 0.5–1.4)
ERYTHROCYTE [DISTWIDTH] IN BLOOD BY AUTOMATED COUNT: 11.6 % (ref 11.5–14.5)
GFR SERPLBLD CREATININE-BSD FMLA CKD-EPI: >60 ML/MIN/1.73/M2
GLUCOSE SERPL-MCNC: 79 MG/DL (ref 70–110)
HCT VFR BLD AUTO: 39.1 % (ref 37–48.5)
HGB BLD-MCNC: 13.4 GM/DL (ref 12–16)
IMM GRANULOCYTES # BLD AUTO: 0.02 K/UL (ref 0–0.04)
IMM GRANULOCYTES NFR BLD AUTO: 0.3 % (ref 0–0.5)
LYMPHOCYTES # BLD AUTO: 1.94 K/UL (ref 1–4.8)
MCH RBC QN AUTO: 30.8 PG (ref 27–31)
MCHC RBC AUTO-ENTMCNC: 34.3 G/DL (ref 32–36)
MCV RBC AUTO: 90 FL (ref 82–98)
NUCLEATED RBC (/100WBC) (OHS): 0 /100 WBC
PLATELET # BLD AUTO: 256 K/UL (ref 150–450)
PMV BLD AUTO: 11.1 FL (ref 9.2–12.9)
POTASSIUM SERPL-SCNC: 4 MMOL/L (ref 3.5–5.1)
PROT SERPL-MCNC: 7.4 GM/DL (ref 6–8.4)
RBC # BLD AUTO: 4.35 M/UL (ref 4–5.4)
RELATIVE EOSINOPHIL (OHS): 3.8 %
RELATIVE LYMPHOCYTE (OHS): 28.4 % (ref 18–48)
RELATIVE MONOCYTE (OHS): 7.9 % (ref 4–15)
RELATIVE NEUTROPHIL (OHS): 57.7 % (ref 38–73)
SODIUM SERPL-SCNC: 138 MMOL/L (ref 136–145)
WBC # BLD AUTO: 6.82 K/UL (ref 3.9–12.7)

## 2025-07-25 PROCEDURE — 85025 COMPLETE CBC W/AUTO DIFF WBC: CPT

## 2025-07-25 PROCEDURE — 36415 COLL VENOUS BLD VENIPUNCTURE: CPT | Mod: PO

## 2025-07-25 PROCEDURE — 80053 COMPREHEN METABOLIC PANEL: CPT

## 2025-07-28 ENCOUNTER — TELEPHONE (OUTPATIENT)
Dept: FAMILY MEDICINE | Facility: CLINIC | Age: 40
End: 2025-07-28
Payer: COMMERCIAL

## 2025-07-28 NOTE — TELEPHONE ENCOUNTER
----- Message from Beryl Cordova DO sent at 7/28/2025  5:57 AM CDT -----  Labs normal  ----- Message -----  From: Lab, Background User  Sent: 7/25/2025   9:03 PM CDT  To: Beryl Cordova DO

## 2025-07-29 ENCOUNTER — ANESTHESIA EVENT (OUTPATIENT)
Dept: ENDOSCOPY | Facility: HOSPITAL | Age: 40
End: 2025-07-29
Payer: COMMERCIAL

## 2025-07-29 NOTE — ANESTHESIA PREPROCEDURE EVALUATION
2025  Daniela Davis is a 40 y.o., female.    Past Medical History:   Diagnosis Date    Headache(784.0)     migraines as a teenager     Current Outpatient Medications   Medication Instructions    cetirizine (ZYRTEC) 10 mg, Daily    Lactobacillus acidophilus (PROBIOTIC ACIDOPHILUS ORAL) Daily    magnesium aspart,citrate,oxide (TRIPLE MAGNESIUM COMPLEX ORAL) 420 mg, Nightly    PSYLLIUM HUSK, ASPARTAME, ORAL 500 mg, Daily    spironolactone (ALDACTONE) 100 mg, Daily     Past Surgical History:   Procedure Laterality Date     SECTION       SECTION       SECTION       SECTION      TUBAL LIGATION           Pre-op Assessment    I have reviewed the Patient Summary Reports.     I have reviewed the Nursing Notes. I have reviewed the NPO Status.   I have reviewed the Medications.     Review of Systems  Anesthesia Hx:   History of prior surgery of interest to airway management or planning:          Denies Family Hx of Anesthesia complications.    Denies Personal Hx of Anesthesia complications.                    Social:  Social Alcohol Use, Former Smoker       Renal/:   renal calculi       Kidney Function/Disease             Hepatic/GI:  Bowel Prep.                   Neurological:      Headaches      Dx of Headaches                               Physical Exam  General: Alert and Oriented    Airway:  Mallampati: I   Mouth Opening: Normal  TM Distance: Normal  Neck ROM: Normal ROM    Dental:  Intact        Anesthesia Plan  Type of Anesthesia, risks & benefits discussed:    Anesthesia Type: Gen Natural Airway  Intra-op Monitoring Plan: Standard ASA Monitors  Post Op Pain Control Plan: multimodal analgesia  Induction:  IV  Informed Consent: Informed consent signed with the Patient and all parties understand the risks and agree with anesthesia plan.  All questions answered.    ASA Score: 2  Day of Surgery Review of History & Physical: H&P Update referred to the surgeon/provider.    Ready For Surgery From Anesthesia Perspective.     .

## 2025-07-30 ENCOUNTER — HOSPITAL ENCOUNTER (OUTPATIENT)
Dept: ENDOSCOPY | Facility: HOSPITAL | Age: 40
Discharge: HOME | End: 2025-07-30
Attending: INTERNAL MEDICINE | Admitting: INTERNAL MEDICINE
Payer: COMMERCIAL

## 2025-07-30 ENCOUNTER — ANESTHESIA (OUTPATIENT)
Dept: ENDOSCOPY | Facility: HOSPITAL | Age: 40
End: 2025-07-30
Payer: COMMERCIAL

## 2025-07-30 VITALS
OXYGEN SATURATION: 97 % | SYSTOLIC BLOOD PRESSURE: 117 MMHG | TEMPERATURE: 98 F | RESPIRATION RATE: 17 BRPM | BODY MASS INDEX: 28.67 KG/M2 | WEIGHT: 161.81 LBS | HEIGHT: 63 IN | DIASTOLIC BLOOD PRESSURE: 74 MMHG | HEART RATE: 63 BPM

## 2025-07-30 DIAGNOSIS — K62.5 RECTAL BLEEDING: Primary | ICD-10-CM

## 2025-07-30 LAB
B-HCG UR QL: NEGATIVE
CTP QC/QA: YES

## 2025-07-30 PROCEDURE — 27201012 HC FORCEPS, HOT/COLD, DISP: Performed by: INTERNAL MEDICINE

## 2025-07-30 PROCEDURE — 88305 TISSUE EXAM BY PATHOLOGIST: CPT | Mod: TC | Performed by: INTERNAL MEDICINE

## 2025-07-30 PROCEDURE — 37000009 HC ANESTHESIA EA ADD 15 MINS

## 2025-07-30 PROCEDURE — 81025 URINE PREGNANCY TEST: CPT | Performed by: INTERNAL MEDICINE

## 2025-07-30 PROCEDURE — 63600175 PHARM REV CODE 636 W HCPCS: Performed by: NURSE ANESTHETIST, CERTIFIED REGISTERED

## 2025-07-30 PROCEDURE — 37000008 HC ANESTHESIA 1ST 15 MINUTES

## 2025-07-30 RX ORDER — SODIUM CHLORIDE, SODIUM LACTATE, POTASSIUM CHLORIDE, CALCIUM CHLORIDE 600; 310; 30; 20 MG/100ML; MG/100ML; MG/100ML; MG/100ML
INJECTION, SOLUTION INTRAVENOUS CONTINUOUS PRN
Status: DISCONTINUED | OUTPATIENT
Start: 2025-07-30 | End: 2025-07-30

## 2025-07-30 RX ORDER — PROPOFOL 10 MG/ML
VIAL (ML) INTRAVENOUS
Status: DISCONTINUED | OUTPATIENT
Start: 2025-07-30 | End: 2025-07-30

## 2025-07-30 RX ORDER — LIDOCAINE HYDROCHLORIDE 20 MG/ML
INJECTION INTRAVENOUS
Status: DISCONTINUED | OUTPATIENT
Start: 2025-07-30 | End: 2025-07-30

## 2025-07-30 RX ORDER — SODIUM CHLORIDE 9 MG/ML
INJECTION, SOLUTION INTRAVENOUS CONTINUOUS
Status: DISCONTINUED | OUTPATIENT
Start: 2025-07-30 | End: 2025-07-31 | Stop reason: HOSPADM

## 2025-07-30 RX ADMIN — PROPOFOL 120 MG: 10 INJECTION, EMULSION INTRAVENOUS at 12:07

## 2025-07-30 RX ADMIN — PROPOFOL 50 MG: 10 INJECTION, EMULSION INTRAVENOUS at 12:07

## 2025-07-30 RX ADMIN — SODIUM CHLORIDE, POTASSIUM CHLORIDE, SODIUM LACTATE AND CALCIUM CHLORIDE: 600; 310; 30; 20 INJECTION, SOLUTION INTRAVENOUS at 12:07

## 2025-07-30 RX ADMIN — LIDOCAINE HYDROCHLORIDE 50 MG: 20 INJECTION INTRAVENOUS at 12:07

## 2025-07-30 RX ADMIN — PROPOFOL 30 MG: 10 INJECTION, EMULSION INTRAVENOUS at 12:07

## 2025-07-30 NOTE — DISCHARGE SUMMARY
[facility] - Endoscopy 1st Fl  Discharge Note  Short Stay    SIGMOIDOSCOPY, FLEXIBLE      OUTCOME: Patient tolerated treatment/procedure well without complication and is now ready for discharge.    DISPOSITION: Home or Self Care    FINAL DIAGNOSIS:  Rectal bleeding    FOLLOWUP: With primary care provider    DISCHARGE INSTRUCTIONS:  No discharge procedures on file.

## 2025-07-30 NOTE — PLAN OF CARE
Discharge instructions reviewed with patient and family, handouts given, verbalized understanding with no further questions at this time. Dr. Tay spoke to pt at bedside, reviewed procedure and answered questions, MD telephone number provided per AVS sheet. No pain or nausea noted, tolerating po fluids without difficulty, no other complaints noted. Fall precautions reviewed, consents in chart, PIV to be removed at discharge.

## 2025-07-30 NOTE — H&P
Short Stay Endoscopy History and Physical    PCP - Beryl Cordova, DO    Procedure - Flexible Sigmoidoscopy  ASA - per anesthesia  Mallampati - per anesthesia  History of Anesthesia problems - no  Family history Anesthesia problems -  no     HPI:  This is a 40 y.o. female here for evaluation of :   Active Hospital Problems    Diagnosis  POA    *Rectal bleeding [K62.5]  No      Resolved Hospital Problems   No resolved problems to display.         Health Maintenance         Date Due Completion Date    Hepatitis C Screening Never done ---    Lipid Panel Never done ---    HIV Screening Never done ---    Mammogram Never done ---    Hemoglobin A1c (Diabetic Prevention Screening) Never done ---    COVID-19 Vaccine ( - - season) Never done ---    Influenza Vaccine (1) 2025 ---    Cervical Cancer Screening 2027    TETANUS VACCINE 2027    RSV Vaccine (Age 60+ and Pregnant patients) (1 - 1-dose 75+ series) 2059 ---              ROS:  CONSTITUTIONAL: Denies weight change,  fatigue, fevers, chills, night sweats.  CARDIOVASCULAR: Denies chest pain, shortness of breath, orthopnea and edema.  RESPIRATORY: Denies cough, hemoptysis, dyspnea, and wheezing.  GI: See HPI.    Medical History:   Past Medical History:   Diagnosis Date    Headache(784.0)     migraines as a teenager       Surgical History:   Past Surgical History:   Procedure Laterality Date     SECTION       SECTION       SECTION       SECTION      TUBAL LIGATION         Family History:   Family History   Problem Relation Name Age of Onset    Hypothyroidism Mother      Diabetes Father      Hypothyroidism Sister      Hypothyroidism Maternal Grandmother         Social History:   Social History[1]    Allergies:   Review of patient's allergies indicates:  No Known Allergies    Medications:   Medications Ordered Prior to Encounter[2]    Physical Exam:  Vital Signs:   Vitals:    25 1144    BP: 122/79   Pulse: 66   Resp: 18   Temp: 98.2 °F (36.8 °C)     General Appearance: Well appearing in no acute distress  ENT: OP clear  Chest: CTA B  CV: RRR, no m/r/g  Abd: s/nt/nd/nabs  Ext: no edema    Labs:Reviewed    IMP:  Active Hospital Problems    Diagnosis  POA    *Rectal bleeding [K62.5]  No      Resolved Hospital Problems   No resolved problems to display.         Plan:   I have explained the risks and benefits of flexible sigmoidoscopy to the patient including but not limited to bleeding, perforation, infection, and death. The patient wishes to proceed.         [1]   Social History  Tobacco Use    Smoking status: Former     Current packs/day: 0.50     Types: Cigarettes     Passive exposure: Never    Smokeless tobacco: Never   Substance Use Topics    Alcohol use: Yes     Comment: occasional     Drug use: No   [2]   Current Outpatient Medications on File Prior to Encounter   Medication Sig Dispense Refill    cetirizine (ZYRTEC) 10 MG tablet Take 10 mg by mouth once daily.      Lactobacillus acidophilus (PROBIOTIC ACIDOPHILUS ORAL) Take by mouth once daily.      magnesium aspart,citrate,oxide (TRIPLE MAGNESIUM COMPLEX ORAL) Take 420 mg by mouth every evening.      PSYLLIUM HUSK, ASPARTAME, ORAL Take 500 mg by mouth once daily.      spironolactone (ALDACTONE) 100 MG tablet Take 100 mg by mouth once daily. Pt takes for cystic acne.       No current facility-administered medications on file prior to encounter.

## 2025-07-30 NOTE — ANESTHESIA POSTPROCEDURE EVALUATION
Anesthesia Post Evaluation    Patient: Daniela Davis    Procedure(s) Performed: * No procedures listed *    Final Anesthesia Type: general      Patient location during evaluation: PACU  Patient participation: Yes- Able to Participate  Level of consciousness: awake  Post-procedure vital signs: reviewed and stable  Pain management: adequate  Airway patency: patent    PONV status at discharge: No PONV  Anesthetic complications: no      Cardiovascular status: stable  Respiratory status: unassisted  Hydration status: euvolemic  Follow-up not needed.              Vitals Value Taken Time   /63 07/30/25 12:56     07/30/25 13:00   Pulse 63 07/30/25 12:59   Resp 39 07/30/25 12:59   SpO2 100 % 07/30/25 12:59   Vitals shown include unfiled device data.      No case tracking events are documented in the log.      Pain/Aleta Score: Aleta Score: 10 (7/30/2025 12:55 PM)

## 2025-07-30 NOTE — TRANSFER OF CARE
"Anesthesia Transfer of Care Note    Patient: Daniela Davis    Procedure(s) Performed: * No procedures listed *    Patient location: PACU    Anesthesia Type: general    Transport from OR: Transported from OR on room air with adequate spontaneous ventilation    Post pain: adequate analgesia    Post assessment: no apparent anesthetic complications and tolerated procedure well    Post vital signs: stable    Level of consciousness: sedated    Nausea/Vomiting: no nausea/vomiting    Complications: none    Transfer of care protocol was followed      Last vitals: Visit Vitals  /79 (BP Location: Right arm, Patient Position: Sitting)   Pulse 66   Temp 36.8 °C (98.2 °F) (Temporal)   Resp 18   Ht 5' 3" (1.6 m)   Wt 73.4 kg (161 lb 13.1 oz)   LMP 07/30/2025   SpO2 100%   Breastfeeding No   BMI 28.66 kg/m²     "

## 2025-08-07 ENCOUNTER — PATIENT MESSAGE (OUTPATIENT)
Dept: GASTROENTEROLOGY | Facility: CLINIC | Age: 40
End: 2025-08-07
Payer: COMMERCIAL

## 2025-08-07 DIAGNOSIS — K62.89 PROCTITIS: Primary | ICD-10-CM

## 2025-08-07 RX ORDER — MESALAMINE 1000 MG/1
1000 SUPPOSITORY RECTAL NIGHTLY
Qty: 30 SUPPOSITORY | Refills: 3 | Status: SHIPPED | OUTPATIENT
Start: 2025-08-07 | End: 2025-12-05

## 2025-08-07 NOTE — PROGRESS NOTES
"[facility]  Gastroenterology    Patient evaluated at the request of No referring provider defined for this encounter.    PCP: Beryl Cordova, DO    25        Subjective:   Daniela Davis is a 40 y.o. female      Past Medical History:   Diagnosis Date    Headache(784.0)     migraines as a teenager       Past Surgical History:   Procedure Laterality Date     SECTION       SECTION       SECTION       SECTION      TUBAL LIGATION         Medications Ordered Prior to Encounter[1]    Review of patient's allergies indicates:  No Known Allergies    Social History[2]    Family History   Problem Relation Name Age of Onset    Hypothyroidism Mother      Diabetes Father      Hypothyroidism Sister      Hypothyroidism Maternal Grandmother         Review of Systems        Objective:   Vitals: There were no vitals filed for this visit.    Physical Exam    {*** HELP TEXT ***    This SmartLink shows lab results for visits on the day of current visit & previous visits. It will accept two parameters,  by commas, which specify the duration and the format of the output.    For example, a user may enter .GETLABS[6M,1    The "6M" instructs [employer] to search 6 months back from the day of the visit the user is presently in to find and display all lab component values in that time period. Entry for this parameter may be in the form #D, #W, #M, or #Y. The "#" indicates a number and the D, W, M, Y stand for days, weeks, months, or years respectively. If no entry is specified for this parameter (.GETLABS[,1), or if there are no results that fall within the time period indicated, then [employer] will display the last known result.    The second parameter controls the display of the SmartLink. It accepts a blank entry, "1", or "2". A blank entry will cause [employer] to display the component name, value, high and low ranges, status and any comments. An entry of "1" will " "display everything stated above except for the comments. An entry of "2" will cause an abbreviated display of just the name and value for each component.}    Failed to redirect to the Timeline version of the [employer] SmartLink.    Failed to redirect to the Timeline version of the [employer] SmartLink.      SIGMOIDOSCOPY, FLEXIBLE  Result Date: 7/30/2025  Table formatting from the original result was not included. Procedure Date 7/30/25 Impression Overall      Mild erythematous mucosa with loss of vascular pattern in the rectum; performed cold forceps biopsy to rule out IBD Internal small hemorrhoids The descending colon and sigmoid colon appeared normal. Recommendation Await pathology results Indication Rectal bleeding Post Procedure Diagnosis None Staff present during procedure Jadyn Felix, John Arguelles CRNA, MD Anesthesiologist Risa Tay MD Proceduralist Rosette Denton Technician Erin Mckinley, Nicole Finney CRNA, RN Nurse Medications Moderate sedation administered by anesthesia staff - See anesthesia record. Preprocedure A history and physical has been performed, and patient medication allergies have been reviewed. The patient's tolerance of previous anesthesia has been reviewed. The risks and benefits of the procedure and the sedation options and risks were discussed with the patient. All questions were answered and informed consent obtained. Details of the Procedure The patient underwent monitored anesthesia care, which was administered by an anesthesia professional. The patient's blood pressure, ECG, ETCO2, heart rate, level of consciousness, oxygen saturation and respirations were monitored throughout the procedure. A digital rectal exam was performed. The scope was introduced through the anus and advanced to the descending colon. Retroflexion was performed in the rectum. Bowel prep was adequate. The patient's estimated blood loss was minimal. The procedure was not " difficult. The patient tolerated the procedure well. There were no apparent adverse events. Scope: Colonoscope Scope Serial: [id number] Events Procedure Events Event Event Time Procedure Events Event Event Time SCOPE IN 7/30/2025 12:38 PM SCOPE OUT 7/30/2025 12:43 PM Findings Mild, continuous erythematous mucosa with loss of vascular pattern in the rectum; performed cold forceps biopsy to rule out IBD Internal small hemorrhoids observed during retroflexion The descending colon and sigmoid colon appeared normal.       IMPRESSION     Problem List Items Addressed This Visit    None      PLANS:    There are no diagnoses linked to this encounter.        Risa Tay MD  Gastroenterology and Hepatology             [1]   Current Outpatient Medications on File Prior to Visit   Medication Sig Dispense Refill    cetirizine (ZYRTEC) 10 MG tablet Take 10 mg by mouth once daily.      Lactobacillus acidophilus (PROBIOTIC ACIDOPHILUS ORAL) Take by mouth once daily.      magnesium aspart,citrate,oxide (TRIPLE MAGNESIUM COMPLEX ORAL) Take 420 mg by mouth every evening.      PSYLLIUM HUSK, ASPARTAME, ORAL Take 500 mg by mouth once daily.      spironolactone (ALDACTONE) 100 MG tablet Take 100 mg by mouth once daily. Pt takes for cystic acne.       No current facility-administered medications on file prior to visit.   [2]   Social History  Socioeconomic History    Marital status:    Tobacco Use    Smoking status: Former     Current packs/day: 0.50     Types: Cigarettes     Passive exposure: Never    Smokeless tobacco: Never   Substance and Sexual Activity    Alcohol use: Yes     Comment: occasional     Drug use: No    Sexual activity: Yes     Partners: Male     Birth control/protection: See Surgical Hx     Social Drivers of Health     Financial Resource Strain: Low Risk  (3/7/2025)    Overall Financial Resource Strain (CARDIA)     Difficulty of Paying Living Expenses: Not hard at all   Food Insecurity: No Food Insecurity  (3/7/2025)    Hunger Vital Sign     Worried About Running Out of Food in the Last Year: Never true     Ran Out of Food in the Last Year: Never true   Transportation Needs: No Transportation Needs (3/7/2025)    PRAPARE - Transportation     Lack of Transportation (Medical): No     Lack of Transportation (Non-Medical): No   Physical Activity: Sufficiently Active (3/7/2025)    Exercise Vital Sign     Days of Exercise per Week: 6 days     Minutes of Exercise per Session: 60 min   Stress: Stress Concern Present (3/7/2025)    [employer] - Occupational Stress Questionnaire     Feeling of Stress : To some extent   Housing Stability: Low Risk  (3/7/2025)    Housing Stability Vital Sign     Unable to Pay for Housing in the Last Year: No     Number of Times Moved in the Last Year: 0     Homeless in the Last Year: No

## 2025-08-11 ENCOUNTER — LAB VISIT (OUTPATIENT)
Dept: LAB | Facility: HOSPITAL | Age: 40
End: 2025-08-11
Attending: INTERNAL MEDICINE
Payer: COMMERCIAL

## 2025-08-11 DIAGNOSIS — K62.89 PROCTITIS: ICD-10-CM

## 2025-08-11 PROCEDURE — 83993 ASSAY FOR CALPROTECTIN FECAL: CPT

## 2025-08-13 ENCOUNTER — OFFICE VISIT (OUTPATIENT)
Dept: OBSTETRICS AND GYNECOLOGY | Facility: CLINIC | Age: 40
End: 2025-08-13
Payer: COMMERCIAL

## 2025-08-13 VITALS
WEIGHT: 169.06 LBS | HEIGHT: 63 IN | BODY MASS INDEX: 29.95 KG/M2 | SYSTOLIC BLOOD PRESSURE: 114 MMHG | DIASTOLIC BLOOD PRESSURE: 78 MMHG

## 2025-08-13 DIAGNOSIS — Z12.4 CERVICAL CANCER SCREENING: ICD-10-CM

## 2025-08-13 DIAGNOSIS — N95.1 PERIMENOPAUSE: ICD-10-CM

## 2025-08-13 DIAGNOSIS — B37.31 VULVOVAGINAL CANDIDIASIS: ICD-10-CM

## 2025-08-13 DIAGNOSIS — Z01.419 WELL WOMAN EXAM WITH ROUTINE GYNECOLOGICAL EXAM: Primary | ICD-10-CM

## 2025-08-13 DIAGNOSIS — Z12.31 ENCOUNTER FOR SCREENING MAMMOGRAM FOR MALIGNANT NEOPLASM OF BREAST: ICD-10-CM

## 2025-08-13 LAB
CALPROTECTIN INTERP (OHS): ABNORMAL
CALPROTECTIN STOOL (OHS): 498 ΜG/G

## 2025-08-13 PROCEDURE — 99396 PREV VISIT EST AGE 40-64: CPT | Mod: 25,S$GLB,, | Performed by: OBSTETRICS & GYNECOLOGY

## 2025-08-13 PROCEDURE — 3078F DIAST BP <80 MM HG: CPT | Mod: CPTII,S$GLB,, | Performed by: OBSTETRICS & GYNECOLOGY

## 2025-08-13 PROCEDURE — 87210 SMEAR WET MOUNT SALINE/INK: CPT | Mod: QW,S$GLB,, | Performed by: OBSTETRICS & GYNECOLOGY

## 2025-08-13 PROCEDURE — 3074F SYST BP LT 130 MM HG: CPT | Mod: CPTII,S$GLB,, | Performed by: OBSTETRICS & GYNECOLOGY

## 2025-08-13 PROCEDURE — 3008F BODY MASS INDEX DOCD: CPT | Mod: CPTII,S$GLB,, | Performed by: OBSTETRICS & GYNECOLOGY

## 2025-08-13 PROCEDURE — 99999 PR PBB SHADOW E&M-EST. PATIENT-LVL IV: CPT | Mod: PBBFAC,,, | Performed by: OBSTETRICS & GYNECOLOGY

## 2025-08-13 PROCEDURE — 87624 HPV HI-RISK TYP POOLED RSLT: CPT | Performed by: OBSTETRICS & GYNECOLOGY

## 2025-08-13 PROCEDURE — 1159F MED LIST DOCD IN RCRD: CPT | Mod: CPTII,S$GLB,, | Performed by: OBSTETRICS & GYNECOLOGY

## 2025-08-13 RX ORDER — CLOTRIMAZOLE AND BETAMETHASONE DIPROPIONATE 10; .64 MG/G; MG/G
CREAM TOPICAL 2 TIMES DAILY
Qty: 45 G | Refills: 0 | Status: SHIPPED | OUTPATIENT
Start: 2025-08-13 | End: 2025-08-20

## 2025-08-13 RX ORDER — FLUCONAZOLE 150 MG/1
150 TABLET ORAL
Qty: 2 TABLET | Refills: 0 | Status: SHIPPED | OUTPATIENT
Start: 2025-08-13 | End: 2025-08-17

## 2025-08-14 ENCOUNTER — HOSPITAL ENCOUNTER (OUTPATIENT)
Dept: RADIOLOGY | Facility: HOSPITAL | Age: 40
Discharge: HOME | End: 2025-08-14
Attending: OBSTETRICS & GYNECOLOGY
Payer: COMMERCIAL

## 2025-08-14 DIAGNOSIS — Z12.31 ENCOUNTER FOR SCREENING MAMMOGRAM FOR MALIGNANT NEOPLASM OF BREAST: ICD-10-CM

## 2025-08-14 PROCEDURE — 77063 BREAST TOMOSYNTHESIS BI: CPT | Mod: 26,,, | Performed by: STUDENT IN AN ORGANIZED HEALTH CARE EDUCATION/TRAINING PROGRAM

## 2025-08-14 PROCEDURE — 77067 SCR MAMMO BI INCL CAD: CPT | Mod: 26,,, | Performed by: STUDENT IN AN ORGANIZED HEALTH CARE EDUCATION/TRAINING PROGRAM

## 2025-08-14 PROCEDURE — 77063 BREAST TOMOSYNTHESIS BI: CPT | Mod: TC

## 2025-08-18 ENCOUNTER — LAB VISIT (OUTPATIENT)
Dept: LAB | Facility: HOSPITAL | Age: 40
End: 2025-08-18
Attending: OBSTETRICS & GYNECOLOGY
Payer: COMMERCIAL

## 2025-08-18 DIAGNOSIS — N95.1 PERIMENOPAUSE: ICD-10-CM

## 2025-08-18 LAB
ESTRADIOL SERPL HS-MCNC: 94 PG/ML
FSH SERPL-ACNC: 2.47 MIU/ML
PROGEST SERPL-MCNC: 5.9 NG/ML
TESTOST SERPL-MCNC: 36 NG/DL (ref 5–73)

## 2025-08-18 PROCEDURE — 84144 ASSAY OF PROGESTERONE: CPT

## 2025-08-18 PROCEDURE — 83001 ASSAY OF GONADOTROPIN (FSH): CPT

## 2025-08-18 PROCEDURE — 84403 ASSAY OF TOTAL TESTOSTERONE: CPT

## 2025-08-18 PROCEDURE — 82670 ASSAY OF TOTAL ESTRADIOL: CPT

## 2025-08-18 PROCEDURE — 36415 COLL VENOUS BLD VENIPUNCTURE: CPT | Mod: PO

## 2025-08-19 ENCOUNTER — PATIENT MESSAGE (OUTPATIENT)
Dept: FAMILY MEDICINE | Facility: CLINIC | Age: 40
End: 2025-08-19
Payer: COMMERCIAL

## 2025-09-04 ENCOUNTER — OFFICE VISIT (OUTPATIENT)
Dept: OBSTETRICS AND GYNECOLOGY | Facility: CLINIC | Age: 40
End: 2025-09-04
Payer: COMMERCIAL

## 2025-09-04 DIAGNOSIS — N95.1 MENOPAUSE SYNDROME: Primary | ICD-10-CM

## 2025-09-04 PROCEDURE — 1159F MED LIST DOCD IN RCRD: CPT | Mod: CPTII,95,, | Performed by: OBSTETRICS & GYNECOLOGY

## 2025-09-04 PROCEDURE — 98005 SYNCH AUDIO-VIDEO EST LOW 20: CPT | Mod: 95,,, | Performed by: OBSTETRICS & GYNECOLOGY

## 2025-09-04 PROCEDURE — 1160F RVW MEDS BY RX/DR IN RCRD: CPT | Mod: CPTII,95,, | Performed by: OBSTETRICS & GYNECOLOGY

## 2025-09-04 RX ORDER — PROGESTERONE 100 MG/1
100 CAPSULE ORAL NIGHTLY
Qty: 30 CAPSULE | Refills: 11 | Status: SHIPPED | OUTPATIENT
Start: 2025-09-04 | End: 2026-09-04